# Patient Record
Sex: FEMALE | Race: WHITE | Employment: FULL TIME | URBAN - METROPOLITAN AREA
[De-identification: names, ages, dates, MRNs, and addresses within clinical notes are randomized per-mention and may not be internally consistent; named-entity substitution may affect disease eponyms.]

---

## 2024-05-06 ENCOUNTER — APPOINTMENT (EMERGENCY)
Dept: RADIOLOGY | Facility: HOSPITAL | Age: 24
End: 2024-05-06
Payer: OTHER MISCELLANEOUS

## 2024-05-06 ENCOUNTER — HOSPITAL ENCOUNTER (EMERGENCY)
Facility: HOSPITAL | Age: 24
Discharge: HOME | End: 2024-05-06
Attending: EMERGENCY MEDICINE | Admitting: EMERGENCY MEDICINE
Payer: OTHER MISCELLANEOUS

## 2024-05-06 VITALS
HEART RATE: 72 BPM | SYSTOLIC BLOOD PRESSURE: 133 MMHG | OXYGEN SATURATION: 100 % | TEMPERATURE: 97.8 F | RESPIRATION RATE: 20 BRPM | DIASTOLIC BLOOD PRESSURE: 75 MMHG

## 2024-05-06 DIAGNOSIS — S66.911A SPRAIN AND STRAIN OF RIGHT HAND: ICD-10-CM

## 2024-05-06 DIAGNOSIS — S63.91XA SPRAIN AND STRAIN OF RIGHT HAND: ICD-10-CM

## 2024-05-06 DIAGNOSIS — S60.221A CONTUSION OF DORSUM OF RIGHT HAND: Primary | ICD-10-CM

## 2024-05-06 PROCEDURE — 99283 EMERGENCY DEPT VISIT LOW MDM: CPT

## 2024-05-06 PROCEDURE — 73110 X-RAY EXAM OF WRIST: CPT

## 2024-05-06 PROCEDURE — 73130 X-RAY EXAM OF HAND: CPT

## 2024-05-06 PROCEDURE — 96372 THER/PROPH/DIAG INJ SC/IM: CPT

## 2024-05-06 PROCEDURE — 99284 EMERGENCY DEPT VISIT MOD MDM: CPT | Performed by: PHYSICIAN ASSISTANT

## 2024-05-06 RX ORDER — KETOROLAC TROMETHAMINE 30 MG/ML
15 INJECTION, SOLUTION INTRAMUSCULAR; INTRAVENOUS ONCE
Status: COMPLETED | OUTPATIENT
Start: 2024-05-06 | End: 2024-05-06

## 2024-05-06 RX ORDER — ACETAMINOPHEN 325 MG/1
975 TABLET ORAL ONCE
Status: COMPLETED | OUTPATIENT
Start: 2024-05-06 | End: 2024-05-06

## 2024-05-06 RX ADMIN — ACETAMINOPHEN 975 MG: 325 TABLET, FILM COATED ORAL at 22:27

## 2024-05-06 RX ADMIN — KETOROLAC TROMETHAMINE 15 MG: 30 INJECTION, SOLUTION INTRAMUSCULAR; INTRAVENOUS at 22:27

## 2024-05-06 NOTE — Clinical Note
Kori Davis was seen and treated in our emergency department on 5/6/2024.                Diagnosis:     Kori  .    She may return on this date: 05/08/2024         If you have any questions or concerns, please don't hesitate to call.      Sukhwinder Deluca PA-C    ______________________________           _______________          _______________  Hospital Representative                              Date                                Time

## 2024-05-07 ENCOUNTER — OCCMED (OUTPATIENT)
Dept: URGENT CARE | Facility: CLINIC | Age: 24
End: 2024-05-07
Payer: OTHER MISCELLANEOUS

## 2024-05-07 ENCOUNTER — HOSPITAL ENCOUNTER (EMERGENCY)
Facility: HOSPITAL | Age: 24
Discharge: HOME | End: 2024-05-07
Attending: EMERGENCY MEDICINE
Payer: OTHER MISCELLANEOUS

## 2024-05-07 VITALS
RESPIRATION RATE: 16 BRPM | DIASTOLIC BLOOD PRESSURE: 80 MMHG | OXYGEN SATURATION: 99 % | WEIGHT: 136.91 LBS | TEMPERATURE: 98.4 F | SYSTOLIC BLOOD PRESSURE: 132 MMHG | HEART RATE: 90 BPM

## 2024-05-07 DIAGNOSIS — S60.221A CONTUSION OF RIGHT HAND, INITIAL ENCOUNTER: ICD-10-CM

## 2024-05-07 DIAGNOSIS — S60.221A CONTUSION OF RIGHT HAND, INITIAL ENCOUNTER: Primary | ICD-10-CM

## 2024-05-07 DIAGNOSIS — S69.91XA HAND INJURY, RIGHT, INITIAL ENCOUNTER: Primary | ICD-10-CM

## 2024-05-07 PROCEDURE — 29125 APPL SHORT ARM SPLINT STATIC: CPT | Performed by: PHYSICIAN ASSISTANT

## 2024-05-07 PROCEDURE — 99244 OFF/OP CNSLTJ NEW/EST MOD 40: CPT | Performed by: ORTHOPAEDIC SURGERY

## 2024-05-07 PROCEDURE — 99283 EMERGENCY DEPT VISIT LOW MDM: CPT

## 2024-05-07 PROCEDURE — 99214 OFFICE O/P EST MOD 30 MIN: CPT | Performed by: PHYSICIAN ASSISTANT

## 2024-05-07 PROCEDURE — 99284 EMERGENCY DEPT VISIT MOD MDM: CPT | Performed by: EMERGENCY MEDICINE

## 2024-05-07 PROCEDURE — 96374 THER/PROPH/DIAG INJ IV PUSH: CPT

## 2024-05-07 RX ORDER — KETOROLAC TROMETHAMINE 30 MG/ML
15 INJECTION, SOLUTION INTRAMUSCULAR; INTRAVENOUS ONCE
Status: COMPLETED | OUTPATIENT
Start: 2024-05-07 | End: 2024-05-07

## 2024-05-07 RX ORDER — IBUPROFEN 400 MG/1
400 TABLET ORAL EVERY 6 HOURS PRN
Qty: 12 TABLET | Refills: 0 | Status: SHIPPED | OUTPATIENT
Start: 2024-05-07 | End: 2024-05-17

## 2024-05-07 RX ADMIN — KETOROLAC TROMETHAMINE 15 MG: 30 INJECTION, SOLUTION INTRAMUSCULAR; INTRAVENOUS at 14:37

## 2024-05-07 NOTE — ED PROVIDER NOTES
"History  Chief Complaint   Patient presents with    Wrist Injury     Pt was at work and hit her right wrist/hand off of a machine. Did not take anything for pain. Pt report tingling in fingers.      Patient is a 23-year-old female with no significant past medical or surgical history that presents to the emergency department with aching persistent nonradiating right hand pain for approximately 3 hours.  Patient denies associated symptoms.  Patient states that she is currently right-handed.  Patient states that while she was at work, she was working on \"a bolt machine and the door fell on my hand.\"  Patient denies right hand injury.  Patient denies palliative factors or provocative factors of pressure to right hand.  Patient denies not effective treatment.  Patient denies fevers, chills, nausea, vomiting, diarrhea, constipation or urinary symptoms.  Patient denies numbness, tingling, loss of power of any other extremities.  Patient denies chest pain, shortness of breath, and abdominal pain.      History provided by:  Patient   used: No        None       No past medical history on file.    No past surgical history on file.    No family history on file.  I have reviewed and agree with the history as documented.    E-Cigarette/Vaping     E-Cigarette/Vaping Substances     Social History     Tobacco Use    Smoking status: Never    Smokeless tobacco: Never   Substance Use Topics    Alcohol use: Never    Drug use: Never       Review of Systems   Constitutional:  Negative for activity change, appetite change, chills and fever.   HENT:  Negative for congestion, ear pain, postnasal drip, rhinorrhea, sinus pressure, sinus pain, sore throat and tinnitus.    Eyes:  Negative for photophobia, pain and visual disturbance.   Respiratory:  Negative for cough, chest tightness and shortness of breath.    Cardiovascular:  Negative for chest pain and palpitations.   Gastrointestinal:  Negative for abdominal pain, " constipation, diarrhea, nausea and vomiting.   Genitourinary:  Negative for difficulty urinating, dysuria, flank pain, frequency, hematuria and urgency.   Musculoskeletal:  Positive for arthralgias. Negative for back pain, gait problem, neck pain and neck stiffness.   Skin:  Negative for color change, pallor and rash.   Allergic/Immunologic: Negative for environmental allergies and food allergies.   Neurological:  Negative for dizziness, seizures, syncope, weakness, numbness and headaches.   Psychiatric/Behavioral:  Negative for confusion.    All other systems reviewed and are negative.      Physical Exam  Physical Exam  Vitals and nursing note reviewed.   Constitutional:       General: She is awake.      Appearance: Normal appearance. She is well-developed and normal weight. She is not ill-appearing, toxic-appearing or diaphoretic.      Comments: /75 (BP Location: Left arm)   Pulse 72   Temp 97.8 °F (36.6 °C) (Oral)   Resp 20   SpO2 100%      HENT:      Head: Normocephalic and atraumatic.      Jaw: There is normal jaw occlusion.      Right Ear: Hearing, tympanic membrane and external ear normal. No decreased hearing noted. No drainage, swelling or tenderness. No mastoid tenderness.      Left Ear: Hearing, tympanic membrane and external ear normal. No decreased hearing noted. No drainage, swelling or tenderness. No mastoid tenderness.      Nose: Nose normal.      Mouth/Throat:      Lips: Pink.      Mouth: Mucous membranes are moist.      Pharynx: Oropharynx is clear. Uvula midline.   Eyes:      General: Lids are normal. Vision grossly intact. Gaze aligned appropriately.         Right eye: No discharge.         Left eye: No discharge.      Extraocular Movements: Extraocular movements intact.      Conjunctiva/sclera: Conjunctivae normal.      Pupils: Pupils are equal, round, and reactive to light.   Neck:      Vascular: No JVD.      Trachea: Trachea and phonation normal. No tracheal tenderness or tracheal  deviation.   Cardiovascular:      Rate and Rhythm: Normal rate and regular rhythm.      Pulses: Normal pulses.           Radial pulses are 2+ on the right side and 2+ on the left side.      Heart sounds: Normal heart sounds. No murmur heard.  Pulmonary:      Effort: Pulmonary effort is normal. No respiratory distress.      Breath sounds: Normal breath sounds and air entry. No stridor. No decreased breath sounds, wheezing, rhonchi or rales.   Abdominal:      Palpations: Abdomen is not rigid.   Musculoskeletal:         General: No swelling. Normal range of motion.      Right hand: Tenderness and bony tenderness present.        Arms:       Cervical back: Full passive range of motion without pain, normal range of motion and neck supple. No rigidity. No spinous process tenderness or muscular tenderness. Normal range of motion.      Comments: Passive ROM intact  lower extremity 5/5 bilaterally  Left upper extremity 5/5, right upper extremity, shoulder 5/5, elbow 5/5, wrist and hand 4/5  Neurovascularly intact  No grinding or clicking of joints           Feet:      Right foot:      Toenail Condition: Right toenails are normal.      Left foot:      Toenail Condition: Left toenails are normal.   Lymphadenopathy:      Head:      Right side of head: No submental, submandibular, tonsillar, preauricular, posterior auricular or occipital adenopathy.      Left side of head: No submental, submandibular, tonsillar, preauricular, posterior auricular or occipital adenopathy.      Cervical: No cervical adenopathy.      Right cervical: No superficial, deep or posterior cervical adenopathy.     Left cervical: No superficial, deep or posterior cervical adenopathy.   Skin:     General: Skin is warm and dry.      Capillary Refill: Capillary refill takes less than 2 seconds.      Findings: No rash.   Neurological:      General: No focal deficit present.      Mental Status: She is alert and oriented to person, place, and time. Mental status  is at baseline.      GCS: GCS eye subscore is 4. GCS verbal subscore is 5. GCS motor subscore is 6.      Sensory: No sensory deficit.      Deep Tendon Reflexes: Reflexes are normal and symmetric.      Reflex Scores:       Patellar reflexes are 2+ on the right side and 2+ on the left side.  Psychiatric:         Attention and Perception: Attention normal.         Mood and Affect: Mood normal.         Speech: Speech normal.         Behavior: Behavior normal. Behavior is cooperative.         Thought Content: Thought content normal.         Judgment: Judgment normal.         Vital Signs  ED Triage Vitals [05/06/24 2050]   Temperature Pulse Respirations Blood Pressure SpO2   97.8 °F (36.6 °C) 72 20 133/75 100 %      Temp Source Heart Rate Source Patient Position - Orthostatic VS BP Location FiO2 (%)   Oral Monitor -- Left arm --      Pain Score       --           Vitals:    05/06/24 2050   BP: 133/75   Pulse: 72         Visual Acuity      ED Medications  Medications   ketorolac (TORADOL) injection 15 mg (15 mg Intramuscular Given 5/6/24 2227)   acetaminophen (TYLENOL) tablet 975 mg (975 mg Oral Given 5/6/24 2227)       Diagnostic Studies  Results Reviewed       None                   XR wrist 3+ views RIGHT   ED Interpretation by Sukhwinder Deluca PA-C (05/06 2305)   No acute osseous normality on initial read by me.      XR hand 3+ views RIGHT   ED Interpretation by Sukhwinder Deluca PA-C (05/06 230)   No acute osseous abnormality on initial read by me.                 Procedures  Procedures         ED Course                                             Medical Decision Making  Patient is a 23-year-old female with no significant past medical or surgical history that presents to the emergency department with aching persistent nonradiating right hand pain for approximately 3 hours.  Hemodynamically stable and afebrile  Right hand x-ray and right wrist x-ray with no acute osseous abnormality on initial read  Patient denied offered  pregnancy test  Toradol and acetaminophen delivered with patient verbalized decrease in right hand symptoms status post medication delivery  Traumatic dorsal right hand ecchymosis, right hand strain versus sprain  Ace wrap  Counseled patient on taking over-the-counter Motrin 400 mg as needed every 6 hours as needed for right hand pain not to exceed 3600 mg daily as needed.  Follow-up with physical therapy  Follow-up with occupational medicine   Follow-up with PCP  Follow-up with emergency department should symptoms persist or exacerbate  Patient demonstrates verbal understandable clinical imaging findings, discharge instructions follow-up and verbalized agreement with patient current treatment plan.    Amount and/or Complexity of Data Reviewed  Radiology: ordered and independent interpretation performed. Decision-making details documented in ED Course.    Risk  OTC drugs.  Prescription drug management.             Disposition  Final diagnoses:   Contusion of dorsum of right hand   Sprain and strain of right hand     Time reflects when diagnosis was documented in both MDM as applicable and the Disposition within this note       Time User Action Codes Description Comment    5/6/2024 11:10 PM Sukhwinder Deluca [S60.221A] Contusion of dorsum of right hand     5/6/2024 11:10 PM Sukhwinder Deluca Add [S63.91XS] Hand sprain, right, sequela     5/6/2024 11:10 PM Sukhwinder Deluca Remove [S63.91XS] Hand sprain, right, sequela     5/6/2024 11:10 PM Sukhwinder Deluca Add [S63.91XA,  S66.911A] Sprain and strain of right hand           ED Disposition       ED Disposition   Discharge    Condition   Stable    Date/Time   Mon May 6, 2024 11:10 PM    Comment   Kori Dvais discharge to home/self care.                   Follow-up Information       Follow up With Specialties Details Why Contact Info Additional Information    [address] [address] [phone]   [facility], [address] [place] [address], [id number] [phone]    [facility] Emergency Medicine   [address] [facility] [address]  [phone] [facility], [address] [place], [place], [address]    Physical Therapy at [facility] Physical Therapy   [address] [facility] [facility] [address]  [facility] [address]  [phone] Physical Therapy at [facility], [address] [facility] [facility] [address], [place], [place], [address]   [phone]    [facility] Occupational Medicine [address]  [facility] [address]  [phone]             There are no discharge medications for this patient.      No discharge procedures on file.    PDMP Review         Value Time User    PDMP Reviewed  Yes 5/6/2024 11:11 PM Sukhwinder Deluca PA-C            ED Provider  Electronically Signed by             Sukhwinder Deluca PA-C  05/07/24 0642

## 2024-05-07 NOTE — Clinical Note
Kori Davis was seen and treated in our emergency department on 5/7/2024.                Diagnosis:     Kori  may return to work on return date.    She may return on this date: 05/10/2024         If you have any questions or concerns, please don't hesitate to call.      Antonio Bennett, DO    ______________________________           _______________          _______________  Hospital Representative                              Date                                Time

## 2024-05-07 NOTE — CONSULTS
Orthopedics   Kori Ryan 23 y.o. female MRN: [id number]  Unit/Bed#: ED-31      Chief Complaint:   Right hand pain, s/p crush injury    HPI:  23 y.o. female who presents for evaluation of worsening right hand pain with associated bruising/ecchymosis. At time of consultation patient reports sustaining a crush injury at work last night were she got her hand stuck in a metal press. She was initially seen in the ER overnight, and fractures were ruled out. Orthopedics engaged to rule out compartment syndrome of the hand given worsening pain and edema.   At time of consult patient denies numbness/tingling. She complains of pain in the dorsum of the hand and over the palm. No pain through the fingers. The hand does not feel cold. The digits are not pale.   She has no wrist pain.     Review Of Systems:   Skin: Normal  Neuro: See HPI  Musculoskeletal: See HPI  14 point review of systems negative except as stated above     Past Medical History:   Past Medical History:   Diagnosis Date    Disease of thyroid gland        Past Surgical History:   History reviewed. No pertinent surgical history.    Family History:  Family history reviewed and non-contributory  History reviewed. No pertinent family history.    Social History:  Social History     Socioeconomic History    Marital status: Single     Spouse name: None    Number of children: None    Years of education: None    Highest education level: None   Occupational History    None   Tobacco Use    Smoking status: Never    Smokeless tobacco: Never   Substance and Sexual Activity    Alcohol use: Never    Drug use: Never    Sexual activity: None   Other Topics Concern    None   Social History Narrative    None     Social Determinants of Health     Financial Resource Strain: Not on file   Food Insecurity: Not on file   Transportation Needs: Not on file   Physical Activity: Not on file   Stress: Not on file   Social Connections: Not on file   Intimate Partner Violence: Not on file  "  Housing Stability: Not on file       Allergies:   No Known Allergies        Labs:  No results found for: \"HCT\", \"HGB\", \"PT\", \"INR\", \"WBC\", \"ESR\", \"CRP\"    Meds:  No current facility-administered medications for this encounter.  No current outpatient medications on file.    Blood Culture:   No results found for: \"BLOODCX\"    Wound Culture:   No results found for: \"WOUNDCULT\"    Ins and Outs:  No intake/output data recorded.          Physical Exam:   /80 (BP Location: Left arm)   Pulse 90   Temp 98.4 °F (36.9 °C) (Oral)   Resp 16   Wt 62.1 kg (136 lb 14.5 oz)   SpO2 99%   Gen: No acute distress, resting comfortably in bed  HEENT: Eyes clear, moist mucus membranes, hearing intact  Respiratory: No audible wheezing or stridor  Cardiovascular: Well Perfused peripherally, 2+ distal pulse  Abdomen: nondistended, no peritoneal signs  Musculoskeletal: right upper extremity  Skin: please see wound images below for full characterization of hand swelling.   She has ttp over the dorsum of the hand and over the palm.   No pain in the thenar eminence or in the thumb.   No pain in the digits or along any of the PIP or DIP joints.   Her range of motion is limited secondary to pain.   Musculature in the palm and dorsum of the hand is soft and compressible.   She is able to move all digits. No pain with any movement of the thumb. She does have increased pain with passive stretch of the digits, but distractible.   SILT m/r/u.   Motor intact ain/pin/m/r/u  2+ radial and ulnar pulse                      Tertiary: no tenderness over all other joints/long bones as except already stated.    Radiology:   I personally reviewed the films.  XRAY right hand/wrist: no acute osseous abnormality.     Procedure:   Patient was placed into a well padded volar resting splint. Patient tolerated procedure well and was neurovascularly intact both pre and post procedure. "     _*_*_*_*_*_*_*_*_*_*_*_*_*_*_*_*_*_*_*_*_*_*_*_*_*_*_*_*_*_*_*_*_*_*_*_*_*_*_*_*_*    Assessment:  23 y.o.female with right hand crush injury.  Based on clinical exam and discussion with ortho hand attg, do not suspect compartment syndrome at this time.      Plan:   NWB right hand in volar resting splint.   Strict ice/elevation.   Digital ROM.   No immediate orthopedic intervention indicated at this time.   Will place patient into volar resting splint today.   Pain control per primary team. .  Dispo: Ortho signing off  Patient to follow up with Dr. Mantilla as outpatient in 3-5 days for re-evaluation.     Kimberly Schwartz PA-C

## 2024-05-07 NOTE — DISCHARGE INSTR - AVS FIRST PAGE
Discharge Instructions - Orthopedics  Kori Davis 23 y.o. female MRN: [id number]  Unit/Bed#: ED-31    Weight Bearing Status:                                           Non weight bearing to the right upper extremity in splint.   Keep your hand elevated at all times.   Use ice.     Pain:  Continue analgesics as directed    Dressing Instructions:   Please keep clean, dry and intact until follow up     Appt Instructions:   If you do not have your appointment, please call the clinic at [phone] to schedule with Dr. Mantilla  Otherwise follow up as scheduled.    Contact the office sooner if you experience any increased numbness/tingling in the extremities.

## 2024-05-07 NOTE — ED ATTENDING ATTESTATION
5/7/2024  IRichmond DO, saw and evaluated the patient. I have discussed the patient with the resident/non-physician practitioner and agree with the resident's/non-physician practitioner's findings, Plan of Care, and MDM as documented in the resident's/non-physician practitioner's note, except where noted. All available labs and Radiology studies were reviewed.  I was present for key portions of any procedure(s) performed by the resident/non-physician practitioner and I was immediately available to provide assistance.       At this point I agree with the current assessment done in the Emergency Department.  I have conducted an independent evaluation of this patient a history and physical is as follows: 23yoF, no pertinent pmhx, presenting with continued rt hand pain s/p industrial accident where it was struck by moving object. XR without acute patho. Pt instructed to start NSAID. Has not. Redirected to ED via urgent care for compartment syndrome r/o.     VSS. Pt tearful. RUE: elbow full ROM. Radial pulse 2+. Digits rt and lt hand equal in warmth. Pulse ox placed on all digits affected hand and 100% on all digits. Cap refill same, approx 3s b/l hands. No skin breaks. Ecchymosis from mid hand down proximal digits. Unable to assess ROM 2/2 pt reluctance due to pain. Rt wrist full ROM.     28-year-old female with right hand bruising.  Other than limitation secondary to bruising, no signs of distal neurovascular limitation.  Seen by Ortho PA and reviewed with hand surgeon and ultimately decision was to splint and follow-up as outpatient.  Instructions provided in regards to the same.  Plan for analgesia discussed. Reviewed all findings both relevant and incidental with the patient at bedside. Pt verbalized understanding of findings, neccesary follow up, return to ED precautions. Pt agreed to review today's findings with their primary care provider. Pt non-toxic appearing upon discharge.           ED Course          Critical Care Time  Procedures

## 2024-05-07 NOTE — ED PROVIDER NOTES
History  Chief Complaint   Patient presents with    Hand Injury     Pt injured right hand with machine yesterday. Pt seen here for the same last night. [facility] in [facility] now with concern for comparments syndrome. +pain and bruising. Pulses and sensation intact     Patient is a 23-year-old female with no significant PMH that presents to the emergency department with pain and swelling to the right hand.  Patient was seen and evaluated in the emergency department yesterday where x-rays were performed and she was found to have no fracture.  She reports she was working on a bolt machine when a door came down and struck her hand.  After getting pain medication and cleared by x-ray imaging last night, she was discharged home and was able to sleep through the night.  She reports that she slept with her hand resting on her chest.  She reports that when she woke she felt well but send she moves the fingers on her hand she experience significant pain.  It was recommended that she take Tylenol and Motrin but took no pain medications prior to arrival today.  She was seen and evaluated at urgent care today who sent her to the ED for further evaluation.  She reports sensation is intact in that hand but that anytime she moves the hand she experiences significant pain.  The patient is right-handed.  She denies any injury elsewhere in her body.        None       Past Medical History:   Diagnosis Date    Disease of thyroid gland        History reviewed. No pertinent surgical history.    History reviewed. No pertinent family history.  I have reviewed and agree with the history as documented.    E-Cigarette/Vaping     E-Cigarette/Vaping Substances     Social History     Tobacco Use    Smoking status: Never    Smokeless tobacco: Never   Substance Use Topics    Alcohol use: Never    Drug use: Never        Review of Systems   Constitutional:  Negative for chills and fever.   Eyes:  Negative for pain and visual disturbance.    Respiratory:  Negative for cough and shortness of breath.    Cardiovascular:  Negative for chest pain and palpitations.   Gastrointestinal:  Negative for abdominal pain, constipation, diarrhea, nausea and vomiting.   Musculoskeletal:  Negative for arthralgias and back pain.        Right hand pain and swelling   Skin:  Positive for color change (bruising, R hand). Negative for rash.   Neurological:  Negative for weakness and numbness.   All other systems reviewed and are negative.      Physical Exam  ED Triage Vitals [05/07/24 1405]   Temperature Pulse Respirations Blood Pressure SpO2   98.4 °F (36.9 °C) 90 16 132/80 99 %      Temp Source Heart Rate Source Patient Position - Orthostatic VS BP Location FiO2 (%)   Oral Monitor Lying Left arm --      Pain Score       10 - Worst Possible Pain             Orthostatic Vital Signs  Vitals:    05/07/24 1405   BP: 132/80   Pulse: 90   Patient Position - Orthostatic VS: Lying       Physical Exam  Vitals and nursing note reviewed.   Constitutional:       General: She is not in acute distress.     Appearance: Normal appearance. She is well-developed. She is not ill-appearing.   HENT:      Head: Normocephalic and atraumatic.      Right Ear: External ear normal.      Left Ear: External ear normal.      Mouth/Throat:      Pharynx: Oropharynx is clear. No oropharyngeal exudate or posterior oropharyngeal erythema.   Eyes:      General:         Right eye: No discharge.         Left eye: No discharge.      Extraocular Movements: Extraocular movements intact.      Conjunctiva/sclera: Conjunctivae normal.   Cardiovascular:      Rate and Rhythm: Normal rate and regular rhythm.      Pulses: Normal pulses.      Heart sounds: Normal heart sounds. No murmur heard.  Pulmonary:      Effort: Pulmonary effort is normal. No respiratory distress.      Breath sounds: Normal breath sounds. No wheezing, rhonchi or rales.   Musculoskeletal:         General: Swelling (R hand) and tenderness (R hand)  present. No deformity.      Cervical back: Neck supple. No tenderness.      Comments: Pain with extension of fingers of right hand   Skin:     General: Skin is warm and dry.      Capillary Refill: Capillary refill takes 2 to 3 seconds. Slightly delayed in fingertips.  Pulse ox normal on each finger tip     Findings: Bruising (R hand) present.   Neurological:      Mental Status: She is alert.         ED Medications  Medications   ketorolac (TORADOL) injection 15 mg (15 mg Intravenous Given 5/7/24 1437)       Diagnostic Studies  Results Reviewed       Procedure Component Value Units Date/Time    POCT pregnancy, urine [[id number]]     Lab Status: No result                    No orders to display         Procedures  Procedures      ED Course  ED Course as of 05/07/24 1538   Tue May 07, 2024   1431 Reached out to hand orthopedic specialist on-call, Dr. Mantilla for recommendations   1456 Dr. Mantilla recommends reaching out to orthopedic PA on-call to come and evaluate    1518 Ortho specialist Kimberly Schwartz, evaluated the patient and recommend Motrin, rest, ice, volar splint for comfort, and follow-up in the office                                       Medical Decision Making  23F here with right hand pain after injury yesterday.    DDx including but not limited to: contusion, fracture, sprain, strain, tendinitis, tenosynovitis, arthritis, carpal tunnel syndrome, cellulitis, lymphangitis, osteomyelitis, ischemic limb, ganglion cyst, radiculopathy, gout, diabetic neuropathy, lyme disease; doubt septic arthritis.      X-rays from yesterday reviewed-no fracture identified    Given patient's pain with passive extension, patient's case discussed with hand surgeon on-call, Dr. Mantilla, who recommends evaluation in the ED by orthopedic PA.  Hand consult placed and orthopedic PA, Kimberly Schwratz, who came and evaluated the patient and has low suspicion for compartment syndrome but recommends Motrin, rest, ice, elevation, and  splint placement.  Orthopedic PA placed splint while in the emergency department.    Hand team will arrange for prompt follow-up in the office within the next 2 days.    In the absence of additional concerning findings on physical exam patient is appropriate for discharge at this time.  Patient provided with informational handout that discusses symptom management and reasons to return to the emergency department.  Return precautions are discussed and the patient and/or family demonstrate understanding of the plan.  Their questions are all answered to their satisfaction and the patient is discharged.        Amount and/or Complexity of Data Reviewed  Labs: ordered.    Risk  Prescription drug management.          Disposition  Final diagnoses:   Hand injury, right, initial encounter   Contusion of right hand, initial encounter     Time reflects when diagnosis was documented in both MDM as applicable and the Disposition within this note       Time User Action Codes Description Comment    5/7/2024  3:06 PM Antonio Bennett [S69.91XA] Hand injury, right, initial encounter     5/7/2024  3:20 PM Antonio Bennett [S60.221A] Contusion of right hand, initial encounter           ED Disposition       ED Disposition   Discharge    Condition   Stable    Date/Time   Tue May 7, 2024  3:20 PM    Comment   Kori Davis discharge to home/self care.                   Follow-up Information       Follow up With Specialties Details Why Contact Info    Hudson Mantilla MD Orthopedic Surgery, Hand Surgery Schedule an appointment as soon as possible for a visit   [address] [place] [id number] [phone]              Patient's Medications   Discharge Prescriptions    IBUPROFEN (MOTRIN) 400 MG TABLET    Take 1 tablet (400 mg total) by mouth every 6 (six) hours as needed for mild pain for up to 3 days       Start Date: 5/7/2024  End Date: 5/10/2024       Order Dose: 400 mg       Quantity: 12 tablet    Refills: 0      No discharge procedures on file.    PDMP Review         Value Time User    PDMP Reviewed  Yes 5/6/2024 11:11 PM Sukhwinder Deluca PA-C             ED Provider  Attending physically available and evaluated Kori Davis. I managed the patient along with the ED Attending.    Electronically Signed by           Antonio Bennett DO  05/07/24 153

## 2024-05-08 ENCOUNTER — APPOINTMENT (OUTPATIENT)
Dept: URGENT CARE | Facility: CLINIC | Age: 24
End: 2024-05-08
Payer: OTHER MISCELLANEOUS

## 2024-05-08 PROCEDURE — 99214 OFFICE O/P EST MOD 30 MIN: CPT | Performed by: FAMILY MEDICINE

## 2024-05-17 ENCOUNTER — OFFICE VISIT (OUTPATIENT)
Dept: OBGYN CLINIC | Facility: CLINIC | Age: 24
End: 2024-05-17

## 2024-05-17 VITALS — WEIGHT: 136 LBS

## 2024-05-17 DIAGNOSIS — S60.221A CONTUSION OF RIGHT HAND, INITIAL ENCOUNTER: Primary | ICD-10-CM

## 2024-05-17 PROCEDURE — 99214 OFFICE O/P EST MOD 30 MIN: CPT | Performed by: STUDENT IN AN ORGANIZED HEALTH CARE EDUCATION/TRAINING PROGRAM

## 2024-05-17 RX ORDER — SENNOSIDES 8.6 MG
TABLET ORAL
COMMUNITY

## 2024-05-17 RX ORDER — NORETHINDRONE ACETATE AND ETHINYL ESTRADIOL AND FERROUS FUMARATE 1.5-30(21)
KIT ORAL
COMMUNITY

## 2024-05-17 RX ORDER — POLYETHYLENE GLYCOL 3350 17 G/DOSE
POWDER (GRAM) ORAL
COMMUNITY
Start: 2024-05-09

## 2024-05-17 RX ORDER — DICLOFENAC SODIUM 75 MG/1
75 TABLET, DELAYED RELEASE ORAL 2 TIMES DAILY WITH MEALS
COMMUNITY
Start: 2024-05-08

## 2024-05-17 RX ORDER — PHENTERMINE HYDROCHLORIDE 37.5 MG/1
37.5 CAPSULE ORAL
COMMUNITY

## 2024-05-17 RX ORDER — LEVOTHYROXINE SODIUM 0.05 MG/1
50 TABLET ORAL DAILY
COMMUNITY

## 2024-05-17 RX ORDER — TOPIRAMATE 25 MG/1
25 TABLET ORAL 2 TIMES DAILY
COMMUNITY
Start: 2024-03-01

## 2024-05-17 RX ORDER — FLUTICASONE PROPIONATE 50 MCG
SPRAY, SUSPENSION (ML) NASAL
COMMUNITY

## 2024-05-17 RX ORDER — METHOCARBAMOL 500 MG/1
500 TABLET, FILM COATED ORAL 2 TIMES DAILY
Qty: 30 TABLET | Refills: 0 | Status: SHIPPED | OUTPATIENT
Start: 2024-05-17

## 2024-05-17 NOTE — PROGRESS NOTES
ORTHOPAEDIC HAND, WRIST, AND ELBOW OFFICE  VISIT      ASSESSMENT/PLAN:      Diagnoses and all orders for this visit:    Contusion of right hand, initial encounter  -     methocarbamol (ROBAXIN) 500 mg tablet; Take 1 tablet (500 mg total) by mouth 2 (two) times a day  -     Ambulatory Referral to PT/OT Hand Therapy; Future    Other orders  -     diclofenac (VOLTAREN) 75 mg EC tablet; Take 75 mg by mouth 2 (two) times a day with meals  -     Dextromethorphan-guaiFENesin  MG/5ML LIQD; Take by mouth every 6 (six) hours  -     fluticasone (FLONASE) 50 mcg/act nasal spray; spray 2 sprays into each nostril every day  -     levothyroxine 50 mcg tablet; Take 50 mcg by mouth daily  -     Lety FE 1.5/30 1.5-30 MG-MCG tablet; TAKE 1 TABLET BY MOUTH EVERY DAY FOR BIRTH CONTROL.  -     phentermine 37.5 MG capsule; Take 37.5 mg by mouth  -     [employer] Purelax 17 GM/SCOOP powder; TAKE 17 G BY MOUTH 2 (TWO) TIMES A DAY. MIX 1 HEAPING TABLESPOON (17 G) WITH 8 OUNCES OF WATER. PREPARE AND DRINK SOLUTION ONCE DAILY.  -     [employer] Senna 8.6 MG tablet; TAKE 1 TABLET (8.6 MG TOTAL) BY MOUTH DAILY AS NEEDED FOR CONSTIPATION (IF NO BM FOR 1 DAY).  -     topiramate (TOPAMAX) 25 mg tablet; Take 25 mg by mouth 2 (two) times a day          23 y.o. female with right hand contusion sustained at work 5/6/2024  Treatment options and expected outcomes were discussed.  The patient verbalized understanding of exam findings and treatment plan.   The patient was given the opportunity to ask questions.  Questions were answered to the patient's satisfaction.  The patient decided to move forward with occupational therapy. Recommend starting with physical therapy to address range of motion.   Patient was provided work note to continue restrictions at this time  She may use the wrist brace as needed  Continue with Diclofenac prescription as prescribed by ED and tylenol as needed for pain  Robaxin sent to patients pharmacy to help with muscle spasm  "      Follow Up:  6 weeks       To Do Next Visit:   Repeat x-ray or right hand if patient is still having significant pain            Oneal Sanders MD  Attending, Orthopaedic Surgery  Hand, Wrist, and Elbow Surgery  [facility]    ______________________________________________________________________________________________    CHIEF COMPLAINT:  Chief Complaint   Patient presents with   • Right Wrist - Pain       SUBJECTIVE:  Patient is a 23 y.o. RHD female who presents today for evaluation and treatment of right hand injury. Patients hand was injured while at work 5/6/2024, when a \"bolt machine door fell on her hand\". She was seen in ED, where x-rays were obtained demonstrating no acute osseous abnormalities.  Patient had follow up at [facility] 5/7/2024 and was transferred back to ED. She presents today in a cock up wrist brace. Patient notes significant pain to the volar hand index and long fingers. She states she is unable to extend or flex her fingers.      Occupation: Makes housing/bolts      I have personally reviewed all the relevant PMH, PSH, SH, FH, Medications and allergies      PAST MEDICAL HISTORY:  Past Medical History:   Diagnosis Date   • Disease of thyroid gland        PAST SURGICAL HISTORY:  History reviewed. No pertinent surgical history.    FAMILY HISTORY:  History reviewed. No pertinent family history.    SOCIAL HISTORY:  Social History     Tobacco Use   • Smoking status: Never   • Smokeless tobacco: Never   Substance Use Topics   • Alcohol use: Never   • Drug use: Never       MEDICATIONS:    Current Outpatient Medications:   •  [employer] Purelax 17 GM/SCOOP powder, TAKE 17 G BY MOUTH 2 (TWO) TIMES A DAY. MIX 1 HEAPING TABLESPOON (17 G) WITH 8 OUNCES OF WATER. PREPARE AND DRINK SOLUTION ONCE DAILY., Disp: , Rfl:   •  [employer] Senna 8.6 MG tablet, TAKE 1 TABLET (8.6 MG TOTAL) BY MOUTH DAILY AS NEEDED FOR CONSTIPATION (IF NO BM FOR 1 DAY)., Disp: , Rfl:   •  " "Dextromethorphan-guaiFENesin  MG/5ML LIQD, Take by mouth every 6 (six) hours, Disp: , Rfl:   •  diclofenac (VOLTAREN) 75 mg EC tablet, Take 75 mg by mouth 2 (two) times a day with meals, Disp: , Rfl:   •  fluticasone (FLONASE) 50 mcg/act nasal spray, spray 2 sprays into each nostril every day, Disp: , Rfl:   •  Lety FE 1.5/30 1.5-30 MG-MCG tablet, TAKE 1 TABLET BY MOUTH EVERY DAY FOR BIRTH CONTROL., Disp: , Rfl:   •  ibuprofen (MOTRIN) 400 mg tablet, Take 1 tablet (400 mg total) by mouth every 6 (six) hours as needed for mild pain for up to 3 days, Disp: 12 tablet, Rfl: 0  •  levothyroxine 50 mcg tablet, Take 50 mcg by mouth daily, Disp: , Rfl:   •  methocarbamol (ROBAXIN) 500 mg tablet, Take 1 tablet (500 mg total) by mouth 2 (two) times a day, Disp: 30 tablet, Rfl: 0  •  phentermine 37.5 MG capsule, Take 37.5 mg by mouth, Disp: , Rfl:   •  topiramate (TOPAMAX) 25 mg tablet, Take 25 mg by mouth 2 (two) times a day, Disp: , Rfl:     ALLERGIES:  No Known Allergies        REVIEW OF SYSTEMS:  Musculoskeletal:        As noted in HPI.   All other systems reviewed and are negative.    VITALS:  There were no vitals filed for this visit.    LABS:  HgA1c: No results found for: \"HGBA1C\"  BMP:   Lab Results   Component Value Date    CALCIUM 9.1 12/02/2022    K 3.8 12/02/2022    CO2 22 (L) 12/02/2022     12/02/2022    BUN 9 12/02/2022    CREATININE 0.81 12/02/2022       _____________________________________________________  PHYSICAL EXAMINATION:  General: Well developed and well nourished, alert & oriented x 3, appears comfortable  Psychiatric: Normal  HEENT: Normocephalic, Atraumatic Trachea Midline, No torticollis  Pulmonary: No audible wheezing or respiratory distress   Abdomen/GI: Non tender, non distended   Cardiovascular: No pitting edema, 2+ radial pulse   Skin: No masses, erythema, lacerations, fluctation, ulcerations  Neurovascular: Sensation Intact to the Median, Ulnar, Radial Nerve, Motor Intact to the " Median, Ulnar, Radial Nerve, and Pulses Intact  Musculoskeletal: Normal, except as noted in detailed exam and in HPI.      MUSCULOSKELETAL EXAMINATION:    Right Hand/Wrist    MCP of index and middle finger 15 degrees shy of full extension   PIP of index and middle finger  30 degrees shy of full extension   Pulp to palm distance 4 cm   Diffusely TTP   Sensation intact  Brisk capillary refill   ___________________________________________________  STUDIES REVIEWED:  Xrays of the right hand and wrist were reviewed and independently interpreted in PACS by Dr. Sanders and demonstrate no acute osseous abnormalities          PROCEDURES PERFORMED:  Procedures  No Procedures performed today    _____________________________________________________      Scribe Attestation    I,:  Trinidad Ceron am acting as a scribe while in the presence of the attending physician.:       I,:  Oneal Sanders MD personally performed the services described in this documentation    as scribed in my presence.:

## 2024-05-17 NOTE — LETTER
May 17, 2024     Patient: Kori Davis  YOB: 2000  Date of Visit: 5/17/2024      To Whom it May Concern:    Kori Davis is under my professional care. Kori was seen in my office on 5/17/2024. Kori will be restricted at work with no use of her right hand.     If you have any questions or concerns, please don't hesitate to call.         Sincerely,          Oneal Sanders MD        CC: No Recipients

## 2024-06-05 ENCOUNTER — EVALUATION (OUTPATIENT)
Dept: PHYSICAL THERAPY | Facility: MEDICAL CENTER | Age: 24
End: 2024-06-05
Payer: OTHER MISCELLANEOUS

## 2024-06-05 DIAGNOSIS — S60.221A CONTUSION OF RIGHT HAND, INITIAL ENCOUNTER: Primary | ICD-10-CM

## 2024-06-05 PROCEDURE — 97140 MANUAL THERAPY 1/> REGIONS: CPT | Performed by: PHYSICAL THERAPIST

## 2024-06-05 NOTE — PROGRESS NOTES
PT Evaluation     Today's date: 2024  Patient name: Kori Davis  : 2000  MRN: [id number]  Referring provider: Oneal Sanders MD  Dx:   Encounter Diagnosis     ICD-10-CM    1. Contusion of right hand, initial encounter  S60.221A Ambulatory Referral to PT/OT Hand Therapy                     Assessment  Impairments: abnormal or restricted ROM, activity intolerance, impaired physical strength, lacks appropriate home exercise program, pain with function, unable to perform ADL and activity limitations  Symptom irritability: high    Assessment details: Pt is a 23 year old RHD female who presents to PT following R hand contusion from a work injury on 24. Pt reports she is unable to move her digits or use her R hand for any ADL's. Wears brace majority of the day. She presents with her digits in protective position, difficulty with active motion. Upon assessment, she had full range to her joints, unable to tolerate composite motion in both flexion and extension. She has diminished sensation to her digits and palm. We tried some MH and gentle massage to her palm, pt was able to tolerate and allowed more lengthening of her flexors. Pt should benefit from skilled PT to help reduce pain, reduce sensitivity and tenderness, increase ROM of her digits, increase strength once her ain and motion improve, and better her overall functioning. Thank you kindly for your referral.   Understanding of Dx/Px/POC: good     Prognosis: good    Goals  STG (2-3 weeks)  1:: pt independent in HEP  2: Pt able to form 75% comp fist  3: Pt able to extend 50% more  4: Pt able to cylindrical and pincher grasp and hold objects without limitation    LTG (6-8 weeks)  1: Improve FOTO > 60/100  2: Pt able to form full comp flexion  3: Pt able to apply pressure to palm  4: Pt zina to perform all ADL's  5: Pt able to lift and carry items > 10 lbs unilaterally  6:  strength at least 40 lbs  7: Pt able to complete job tasks without  limitation    Plan  Patient would benefit from: skilled physical therapy  Planned modality interventions: TENS and thermotherapy: hydrocollator packs    Planned therapy interventions: joint mobilization, manual therapy, massage, nerve gliding, neuromuscular re-education, patient/caregiver education, strengthening, stretching, therapeutic activities, therapeutic exercise, sensory integrative techniques, fine motor coordination training, flexibility, functional ROM exercises and home exercise program    Frequency: 2x week  Duration in weeks: 8  Plan of Care beginning date: 2024  Plan of Care expiration date: 2024  Treatment plan discussed with: patient  Plan details: Initiate PT as per POC        Subjective Evaluation    History of Present Illness  Date of onset: 2024  Mechanism of injury: trauma  Mechanism of injury: Pt had contusion to R hand while at work went to ER  Never had a prior injury to R hand  Hand has been feeling hot  Unable to use R hand for ADL's, requires help from family  Has to keep moving hand to help relieve her symptoms  Difficulty sleeping at night   Pain gets worse throughout the day  Having L neck/shoulder pain from overusing her L UE for work activities, had to leave work a couple times 2/2 the pain in her L shoulder and neck                Not a recurrent problem   Quality of life: good    Patient Goals  Patient goals for therapy: increased strength, independence with ADLs/IADLs, return to work, decreased pain and increased motion    Pain  Current pain ratin  At worst pain rating: 10  Quality: discomfort, throbbing and radiating  Relieving factors: rest, support and relaxation  Exacerbated by: any movement of digits.  Progression: no change    Social Support    Employment status: working (light duty- moving items, using L hand only)  Hand dominance: right  Exercise history: gym, running          Objective     Observations     Additional Observation Details  Mild edema to  dorsal distal hand along 3rd and 4th MCP joint  Mild eccyhmosis along palmar crease    Tenderness     Additional Tenderness Details  Severe tenderness to palm, hypersensitivity, some diminished light touch    Neurological Testing     Sensation     Wrist/Hand     Right   Diminished: light touch    Comments   Right light touch: 2g to IF, MF, 0.2 g RF, SF    Active Range of Motion     Right Wrist   Wrist flexion: 60 degrees   Wrist extension: 45 degrees     Right Thumb   Flexion     MP: 55    DIP: 65  Palmar Abduction    CMC: 60  Radial Abduction    CMC: 55    Right Digits   Flexion   Index     MCP: 65    PIP: 65    DIP: 35  Middle     MCP: 65    PIP: 85    DIP: 45  Ring     MCP: 5    PIP: 85    DIP: 45  Little     MCP: 40    PIP: 80    DIP: 65    Additional Active Range of Motion Details  Sup/pron full  Pt unable to fully extend digits actively    Passive Range of Motion     Right Wrist   Wrist extension: 55 degrees with pain    Additional Passive Range of Motion Details  Each joint has full mobility- able to move while keeping tendons on slack  Unable to tolerate any composite motion    Swelling     Left Wrist/Hand   Circumference MCP: 17.9 cm    Right Wrist/Hand   Circumference MCP: 18.4 cm             Precautions: DOI 5/6/24  HEP: STM, desensitization activities, digits PROM,     Manuals             MH in extension             STM             PROM digits                          Neuro Re-Ed             Tennis ball rotation             Ball roll for massage                                                                              Ther Ex             Towel bunches             Cones grasping             Wrist AROM with cone                                                                              Ther Activity                                       Gait Training                                       Modalities

## 2024-06-06 PROCEDURE — 97161 PT EVAL LOW COMPLEX 20 MIN: CPT | Performed by: PHYSICAL THERAPIST

## 2024-06-10 ENCOUNTER — OFFICE VISIT (OUTPATIENT)
Dept: PHYSICAL THERAPY | Facility: MEDICAL CENTER | Age: 24
End: 2024-06-10
Payer: OTHER MISCELLANEOUS

## 2024-06-10 DIAGNOSIS — S60.221A CONTUSION OF RIGHT HAND, INITIAL ENCOUNTER: Primary | ICD-10-CM

## 2024-06-10 PROCEDURE — 97140 MANUAL THERAPY 1/> REGIONS: CPT | Performed by: PHYSICAL THERAPIST

## 2024-06-10 PROCEDURE — 97112 NEUROMUSCULAR REEDUCATION: CPT | Performed by: PHYSICAL THERAPIST

## 2024-06-10 PROCEDURE — 97110 THERAPEUTIC EXERCISES: CPT | Performed by: PHYSICAL THERAPIST

## 2024-06-10 NOTE — PROGRESS NOTES
Daily Note     Today's date: 6/10/2024  Patient name: Kori Davis  : 2000  MRN: [id number]  Referring provider: Oneal Sanders MD  Dx:   Encounter Diagnosis     ICD-10-CM    1. Contusion of right hand, initial encounter  S60.221A                      Subjective: Pt reports her fingers and palm are doing better. She has noticed better movement in her digits from performing the exercises. Likes using the rice and sometimes warm water. The middle finger is the worst and the most difficult to stretch.       Objective: See treatment diary below      Assessment: Tolerated treatment well. Patient exhibited good technique with therapeutic exercises and would benefit from continued PT  Initiated program  + MCP joint stiffness  in IF and MF moving into hyperextension abl to reach 0 degrees, moderate long flexor tightness but bulk of limitation is within the joint itself; firmness along volar MCP joints, could be from holding in protective position  Able to passively flex each digit into palm without limitation; no limitation in ROM in RF and SF  Overall, pt as improved significantly in her ROM and pain tolerance.       Plan: Continue per plan of care.      Precautions: DOI 24  HEP: STM, desensitization activities, digits PROM,     Manuals 6/10            MH in extension Rolled in palm            STM 5            PROM digits 15             25            Neuro Re-Ed             Tennis ball rotation 1 round            Ball roll for massage 2 min                                                                 8            Ther Ex             Towel bunches Tissues 2 min            Cones grasping 3 min            Wrist AROM with cone 2x10                                                    10                         Ther Activity                                       Gait Training                                       Modalities

## 2024-06-12 ENCOUNTER — OFFICE VISIT (OUTPATIENT)
Dept: PHYSICAL THERAPY | Facility: MEDICAL CENTER | Age: 24
End: 2024-06-12
Payer: OTHER MISCELLANEOUS

## 2024-06-12 DIAGNOSIS — S60.221A CONTUSION OF RIGHT HAND, INITIAL ENCOUNTER: Primary | ICD-10-CM

## 2024-06-12 PROCEDURE — 97112 NEUROMUSCULAR REEDUCATION: CPT | Performed by: PHYSICAL THERAPIST

## 2024-06-12 PROCEDURE — 97110 THERAPEUTIC EXERCISES: CPT | Performed by: PHYSICAL THERAPIST

## 2024-06-12 PROCEDURE — 97140 MANUAL THERAPY 1/> REGIONS: CPT | Performed by: PHYSICAL THERAPIST

## 2024-06-12 NOTE — PROGRESS NOTES
Daily Note     Today's date: 2024  Patient name: Kori Davis  : 2000  MRN: [id number]  Referring provider: Oneal aSnders MD  Dx:   Encounter Diagnosis     ICD-10-CM    1. Contusion of right hand, initial encounter  S60.221A                      Subjective: Pt reports she is gradually feeling better      Objective: See treatment diary below      Assessment: Tolerated treatment well. Patient exhibited good technique with therapeutic exercises and would benefit from continued PT  Significant improvement in gliding; able to reach full extension of digits- about 80% comp flexion ( post manuals)  Still tension moving IF and MF MCP joints into hyperextension  Pt reported mild tension post session but will continue to stretch the rest of the day.       Plan: Continue per plan of care.      Precautions: DOI 24  HEP: STM, desensitization activities, digits PROM,     Manuals 6/10 6/12           MH in extension Rolled in palm 10           STM 5 5           PROM digits 15 10            25 20           Neuro Re-Ed             Tennis ball rotation 1 round 1 round           Ball roll for massage 2 min 2 min                                                                8 8           Ther Ex             Towel bunches Tissues 2 min Towel 2 min           Cones grasping 3 min Pegs 3 min           Wrist AROM with cone 2x10 2x10           Pegs prehension  3 min                                      10 12                        Ther Activity                                       Gait Training                                       Modalities

## 2024-06-19 ENCOUNTER — OFFICE VISIT (OUTPATIENT)
Dept: PHYSICAL THERAPY | Facility: MEDICAL CENTER | Age: 24
End: 2024-06-19
Payer: OTHER MISCELLANEOUS

## 2024-06-19 DIAGNOSIS — S60.221A CONTUSION OF RIGHT HAND, INITIAL ENCOUNTER: Primary | ICD-10-CM

## 2024-06-19 PROCEDURE — 97110 THERAPEUTIC EXERCISES: CPT | Performed by: PHYSICAL THERAPIST

## 2024-06-19 PROCEDURE — 97112 NEUROMUSCULAR REEDUCATION: CPT | Performed by: PHYSICAL THERAPIST

## 2024-06-19 PROCEDURE — 97140 MANUAL THERAPY 1/> REGIONS: CPT | Performed by: PHYSICAL THERAPIST

## 2024-06-19 NOTE — PROGRESS NOTES
Daily Note     Today's date: 2024  Patient name: Kori Davis  : 2000  MRN: [id number]  Referring provider: Oneal Sanders MD  Dx:   Encounter Diagnosis     ICD-10-CM    1. Contusion of right hand, initial encounter  S60.221A                      Subjective: Pt reports she had some swelling in her R hand over the weekend. Continuing to work on her ROM      Objective: See treatment diary below      Assessment: Tolerated treatment well. Patient exhibited good technique with therapeutic exercises and would benefit from continued PT  Pt still has moderate tension at her 3rd MCP joint, firmness in distal palm. Able to reach 0 degrees of extension passively  No intrinsic tightness, just long flexor and volar MCP joint stiffness  Pt able to tolerate hand being flat on table  Added light resistance to program, pt tolerated well  Pt noted some soreness along distal palm along MF after exercises    Plan: Continue per plan of care.      Precautions: DOI 24  HEP: STM, desensitization activities, digits PROM,     Manuals 6/10 6/12 6/19          MH in extension Rolled in palm 10 10 flat          STM 5 5 5          PROM digits 15 10 10           25 20 20          Neuro Re-Ed             Tennis ball rotation 1 round 1 round 1 round          Ball roll for massage 2 min 2 min 2 min                                                               8 8 8          Ther Ex             Towel bunches Tissues 2 min Towel 2 min Towel 2 min          Cones grasping 3 min Pegs 3 min Pegs 3 min          Wrist AROM with cone 2x10 2x10 1# 2x10          Pegs prehension  3 min 3 min          fingerweb   Yellow 20x                        10 12 12                       Ther Activity                                       Gait Training                                       Modalities

## 2024-06-21 ENCOUNTER — OFFICE VISIT (OUTPATIENT)
Dept: PHYSICAL THERAPY | Facility: MEDICAL CENTER | Age: 24
End: 2024-06-21
Payer: OTHER MISCELLANEOUS

## 2024-06-21 DIAGNOSIS — S60.221A CONTUSION OF RIGHT HAND, INITIAL ENCOUNTER: Primary | ICD-10-CM

## 2024-06-21 PROCEDURE — 97140 MANUAL THERAPY 1/> REGIONS: CPT | Performed by: PHYSICAL THERAPIST

## 2024-06-21 PROCEDURE — 97110 THERAPEUTIC EXERCISES: CPT | Performed by: PHYSICAL THERAPIST

## 2024-06-21 NOTE — PROGRESS NOTES
Daily Note     Today's date: 2024  Patient name: Kori Davis  : 2000  MRN: [id number]  Referring provider: Oneal Sanders MD  Dx:   Encounter Diagnosis     ICD-10-CM    1. Contusion of right hand, initial encounter  S60.221A                      Subjective: Pt reports her fingers are doing better. Concerned about rotation of IF at PIP joint.      Objective: See treatment diary below      Assessment: Tolerated treatment well. Patient exhibited good technique with therapeutic exercises and would benefit from continued PT  IF PIP joint has mild deviation ulnarly, asymmetrical from L hand  Pt still has moderate stiffness in 3rd MCP joint into hyperextension able to reach 10-15 degrees  Added more light strengthening to program, pt had some soreness but able to complete      Plan: Continue per plan of care.      Precautions: DOI 24  HEP: STM, desensitization activities, digits PROM,     Manuals 6/10 6/12 6/19 6/21         MH in extension Rolled in palm 10 10 flat 10 flat         STM 5 5 5 5         PROM digits 15 10 10 10          25 20 20 20         Neuro Re-Ed             Tennis ball rotation 1 round 1 round 1 round          Ball roll for massage 2 min 2 min 2 min NP                                                              8 8 8          Ther Ex             Towel bunches Tissues 2 min Towel 2 min Towel 2 min Towel 2 min         Cones grasping 3 min Pegs 3 min Pegs 3 min Pegs 3 min         Wrist AROM with cone 2x10 2x10 1# 2x10 1# 2x10         Pegs prehension  3 min 3 min 3 min         fingerweb   Yellow 20x Yellow 20x         digiweb    Light green 30x         Digiball puppet hand    Yellow 20x          10 12 12 20                      Ther Activity                                       Gait Training                                       Modalities

## 2024-06-25 ENCOUNTER — OFFICE VISIT (OUTPATIENT)
Dept: PHYSICAL THERAPY | Facility: MEDICAL CENTER | Age: 24
End: 2024-06-25
Payer: OTHER MISCELLANEOUS

## 2024-06-25 DIAGNOSIS — S60.221A CONTUSION OF RIGHT HAND, INITIAL ENCOUNTER: Primary | ICD-10-CM

## 2024-06-25 PROCEDURE — 97110 THERAPEUTIC EXERCISES: CPT | Performed by: PHYSICAL THERAPIST

## 2024-06-25 PROCEDURE — 97140 MANUAL THERAPY 1/> REGIONS: CPT | Performed by: PHYSICAL THERAPIST

## 2024-06-25 NOTE — PROGRESS NOTES
Daily Note     Today's date: 2024  Patient name: Kori Davis  : 2000  MRN: [id number]  Referring provider: Oneal Sanders MD  Dx:   Encounter Diagnosis     ICD-10-CM    1. Contusion of right hand, initial encounter  S60.221A                      Subjective: Pt reports she feels she is getting better. Wondering if she can do without her brace, really hot when wearing it.       Objective: See treatment diary below      Assessment: Tolerated treatment well. Patient exhibited good technique with therapeutic exercises and would benefit from continued PT  Pt has softer feel to scar tissue along distal palm, still moderate stiffness at her 3rd MCP joint especially moving into hyperextension. Pt able to tolerate firmer pressure during STM  Discussed weaning from brace. Fabricated RMO orthosis to move her MF into more extension relative to adjacent digits to help stretch tissues in her distal palm, pt comfortable with fit. Able to perform some of her exercises while wearing.  No complaints post session    Plan: Continue per plan of care.      Precautions: DOI 24  HEP: STM, desensitization activities, digits PROM,     Manuals 6/10 6/12 6/19 6/21 6/25        MH in extension Rolled in palm 10 10 flat 10 flat 10 flat        STM 5 5 5 5 5        PROM digits 15 10 10 10 5         25 20 20 20 20        Neuro Re-Ed             Tennis ball rotation 1 round 1 round 1 round          Ball roll for massage 2 min 2 min 2 min NP                                                              8 8 8          Ther Ex             Towel bunches Tissues 2 min Towel 2 min Towel 2 min Towel 2 min Towel 2 min        Cones grasping 3 min Pegs 3 min Pegs 3 min Pegs 3 min Pegs 3 min        Wrist AROM with cone 2x10 2x10 1# 2x10 1# 2x10 2# 2x10        Pegs prehension  3 min 3 min 3 min 3 min        fingerweb   Yellow 20x Yellow 20x  Yellow 30x        digiweb    Light green 30x Light green 30x        Digiball puppet hand    Yellow  20x Yellow 20x         10 12 12 20 20                     Ther Activity                                       Gait Training                                       Modalities

## 2024-06-27 ENCOUNTER — OFFICE VISIT (OUTPATIENT)
Dept: PHYSICAL THERAPY | Facility: MEDICAL CENTER | Age: 24
End: 2024-06-27
Payer: OTHER MISCELLANEOUS

## 2024-06-27 DIAGNOSIS — S60.221A CONTUSION OF RIGHT HAND, INITIAL ENCOUNTER: Primary | ICD-10-CM

## 2024-06-27 PROCEDURE — 97110 THERAPEUTIC EXERCISES: CPT | Performed by: PHYSICAL THERAPIST

## 2024-06-27 PROCEDURE — 97140 MANUAL THERAPY 1/> REGIONS: CPT | Performed by: PHYSICAL THERAPIST

## 2024-06-27 NOTE — PROGRESS NOTES
Daily Note     Today's date: 2024  Patient name: Kori Davis  : 2000  MRN: [id number]  Referring provider: Oneal Sanders MD  Dx:   Encounter Diagnosis     ICD-10-CM    1. Contusion of right hand, initial encounter  S60.221A                      Subjective: Pt reports her hand feel overall less hot and swollen. Still tight when trying to fully extend her digits. No issue with RMO.      Objective: See treatment diary below      Assessment: Tolerated treatment well. Patient exhibited good technique with therapeutic exercises and would benefit from continued PT  Pt has decreased firmness to her soft tissue in her palm, localized more along DPC of 3rd digit  Still firmness in her MCP joint moving into hyperextension, decreased long flexor tightness. Limitation more at joint itself; able to move passively joint into about 15-20 degree of hyperextension independently  No complaints post session    Plan: Continue per plan of care.      Precautions: DOI 24  HEP: STM, desensitization activities, digits PROM,     Manuals 6/10 6/12 6/19 6/21 6/25 6/27       MH in extension Rolled in palm 10 10 flat 10 flat 10 flat 10 flate       STM 5 5 5 5 5 5       PROM digits 15 10 10 10 5 5        25 20 20 20 20 20       Neuro Re-Ed             Tennis ball rotation 1 round 1 round 1 round          Ball roll for massage 2 min 2 min 2 min NP                                                              8 8 8          Ther Ex             Towel bunches Tissues 2 min Towel 2 min Towel 2 min Towel 2 min Towel 2 min Ball roll fr stretch 2 min       Cones grasping 3 min Pegs 3 min Pegs 3 min Pegs 3 min Pegs 3 min Skinny pegs       Wrist AROM with cone 2x10 2x10 1# 2x10 1# 2x10 2# 2x10 2# 2x10       Pegs prehension  3 min 3 min 3 min 3 min 3 min       fingerweb   Yellow 20x Yellow 20x  Yellow 30x Red 20x       digiweb    Light green 30x Light green 30x Light green 30x       Digiball puppet hand    Yellow 20x Yellow 20x Red  30x        10 12 12 20 20 20                    Ther Activity                                       Gait Training                                       Modalities

## 2024-06-28 ENCOUNTER — HOSPITAL ENCOUNTER (OUTPATIENT)
Dept: RADIOLOGY | Facility: HOSPITAL | Age: 24
End: 2024-06-28
Attending: STUDENT IN AN ORGANIZED HEALTH CARE EDUCATION/TRAINING PROGRAM
Payer: OTHER MISCELLANEOUS

## 2024-06-28 ENCOUNTER — OFFICE VISIT (OUTPATIENT)
Dept: OBGYN CLINIC | Facility: CLINIC | Age: 24
End: 2024-06-28
Payer: OTHER MISCELLANEOUS

## 2024-06-28 VITALS — WEIGHT: 136 LBS

## 2024-06-28 DIAGNOSIS — S60.221A CONTUSION OF RIGHT HAND, INITIAL ENCOUNTER: ICD-10-CM

## 2024-06-28 DIAGNOSIS — S60.221A CONTUSION OF RIGHT HAND, INITIAL ENCOUNTER: Primary | ICD-10-CM

## 2024-06-28 PROCEDURE — 73130 X-RAY EXAM OF HAND: CPT

## 2024-06-28 PROCEDURE — 99213 OFFICE O/P EST LOW 20 MIN: CPT | Performed by: STUDENT IN AN ORGANIZED HEALTH CARE EDUCATION/TRAINING PROGRAM

## 2024-06-28 NOTE — LETTER
June 28, 2024     Patient: Kori Davis  YOB: 2000  Date of Visit: 6/28/2024      To Whom it May Concern:    Kori Davis is under my professional care. Kori was seen in my office on 6/28/2024. Kori may return to work with no use of her right hand.     If you have any questions or concerns, please don't hesitate to call.         Sincerely,          Oneal Sanders MD

## 2024-06-28 NOTE — PROGRESS NOTES
ORTHOPAEDIC HAND, WRIST, AND ELBOW OFFICE  VISIT      ASSESSMENT/PLAN:      Diagnoses and all orders for this visit:    Contusion of right hand, initial encounter  -     XR hand 3+ vw right; Future          23 y.o. female with right hand contusion sustained at work 5/6/2024     The patient has improved from prior examination. X-rays were reviewed which demonstrate no fractures or dislocations. The patient was instructed to continue her efforts at formal therapy. She was instructed to work on scar massage into the palm.     Follow Up:  6 weeks       To Do Next Visit:  Re-evaluation of current issue            Oneal Sanders MD  Attending, Orthopaedic Surgery  Hand, Wrist, and Elbow Surgery  [facility]    ______________________________________________________________________________________________    CHIEF COMPLAINT:  Chief Complaint   Patient presents with   • Right Wrist - Follow-up       SUBJECTIVE:  Patient is a 23 y.o. RHD female who presents today for follow up of right hand contusion sustained at work 5/6/2024.  The patient states she is doing better. She has been attending formal therapy and making progress. She states her pain is improved some. She notes pain into her index and long finger. She also notes some pain into the palm.     Occupation: Makes housing/bolts      I have personally reviewed all the relevant PMH, PSH, SH, FH, Medications and allergies      PAST MEDICAL HISTORY:  Past Medical History:   Diagnosis Date   • Disease of thyroid gland        PAST SURGICAL HISTORY:  History reviewed. No pertinent surgical history.    FAMILY HISTORY:  History reviewed. No pertinent family history.    SOCIAL HISTORY:  Social History     Tobacco Use   • Smoking status: Never   • Smokeless tobacco: Never   Substance Use Topics   • Alcohol use: Never   • Drug use: Never       MEDICATIONS:    Current Outpatient Medications:   •  [employer] Purelax 17 GM/SCOOP powder, TAKE 17 G BY MOUTH 2 (TWO)  "TIMES A DAY. MIX 1 HEAPING TABLESPOON (17 G) WITH 8 OUNCES OF WATER. PREPARE AND DRINK SOLUTION ONCE DAILY., Disp: , Rfl:   •  [employer] Senna 8.6 MG tablet, TAKE 1 TABLET (8.6 MG TOTAL) BY MOUTH DAILY AS NEEDED FOR CONSTIPATION (IF NO BM FOR 1 DAY)., Disp: , Rfl:   •  Dextromethorphan-guaiFENesin  MG/5ML LIQD, Take by mouth every 6 (six) hours, Disp: , Rfl:   •  diclofenac (VOLTAREN) 75 mg EC tablet, Take 75 mg by mouth 2 (two) times a day with meals, Disp: , Rfl:   •  fluticasone (FLONASE) 50 mcg/act nasal spray, spray 2 sprays into each nostril every day, Disp: , Rfl:   •  Lety FE 1.5/30 1.5-30 MG-MCG tablet, TAKE 1 TABLET BY MOUTH EVERY DAY FOR BIRTH CONTROL., Disp: , Rfl:   •  levothyroxine 50 mcg tablet, Take 50 mcg by mouth daily, Disp: , Rfl:   •  methocarbamol (ROBAXIN) 500 mg tablet, Take 1 tablet (500 mg total) by mouth 2 (two) times a day, Disp: 30 tablet, Rfl: 0  •  phentermine 37.5 MG capsule, Take 37.5 mg by mouth, Disp: , Rfl:   •  topiramate (TOPAMAX) 25 mg tablet, Take 25 mg by mouth 2 (two) times a day, Disp: , Rfl:   •  ibuprofen (MOTRIN) 400 mg tablet, Take 1 tablet (400 mg total) by mouth every 6 (six) hours as needed for mild pain for up to 3 days, Disp: 12 tablet, Rfl: 0    ALLERGIES:  No Known Allergies        REVIEW OF SYSTEMS:  Musculoskeletal:        As noted in HPI.   All other systems reviewed and are negative.    VITALS:  There were no vitals filed for this visit.    LABS:  HgA1c: No results found for: \"HGBA1C\"  BMP:   Lab Results   Component Value Date    CALCIUM 9.1 12/02/2022    K 3.8 12/02/2022    CO2 22 (L) 12/02/2022     12/02/2022    BUN 9 12/02/2022    CREATININE 0.81 12/02/2022       _____________________________________________________  PHYSICAL EXAMINATION:  General: Well developed and well nourished, alert & oriented x 3, appears comfortable  Psychiatric: Normal  HEENT: Normocephalic, Atraumatic Trachea Midline, No torticollis  Pulmonary: No audible wheezing or " respiratory distress   Abdomen/GI: Non tender, non distended   Cardiovascular: No pitting edema, 2+ radial pulse   Skin: No masses, erythema, lacerations, fluctation, ulcerations  Neurovascular: Sensation Intact to the Median, Ulnar, Radial Nerve, Motor Intact to the Median, Ulnar, Radial Nerve, and Pulses Intact  Musculoskeletal: Normal, except as noted in detailed exam and in HPI.      MUSCULOSKELETAL EXAMINATION:  Right hand   Pulp to palm 1.5 cm  Ulnar deviation deformity of index finger  Full digit extension  No tenderness to index finger PIP or proximal or distal phalanx  Tenderness to palm of hand over index and middle metacarpals    ___________________________________________________  STUDIES REVIEWED:  Xrays of the right hand were reviewed and independently interpreted in PACS by Dr. Sanders and demonstrate right index finger bony prominence proximal phalanx over radial aspect. No fractures or dislocations.        PROCEDURES PERFORMED:  Procedures  No Procedures performed today    _____________________________________________________      Scribe Attestation    I,:  Princess Copeland MA am acting as a scribe while in the presence of the attending physician.:       I,:  Oneal Sanders MD personally performed the services described in this documentation    as scribed in my presence.:

## 2024-07-01 ENCOUNTER — APPOINTMENT (OUTPATIENT)
Dept: PHYSICAL THERAPY | Facility: MEDICAL CENTER | Age: 24
End: 2024-07-01
Payer: OTHER MISCELLANEOUS

## 2024-07-03 ENCOUNTER — OFFICE VISIT (OUTPATIENT)
Dept: PHYSICAL THERAPY | Facility: MEDICAL CENTER | Age: 24
End: 2024-07-03
Payer: OTHER MISCELLANEOUS

## 2024-07-03 DIAGNOSIS — S60.221A CONTUSION OF RIGHT HAND, INITIAL ENCOUNTER: Primary | ICD-10-CM

## 2024-07-03 PROCEDURE — 97140 MANUAL THERAPY 1/> REGIONS: CPT | Performed by: PHYSICAL THERAPIST

## 2024-07-03 PROCEDURE — 97110 THERAPEUTIC EXERCISES: CPT | Performed by: PHYSICAL THERAPIST

## 2024-07-03 NOTE — PROGRESS NOTES
Daily Note     Today's date: 7/3/2024  Patient name: Kori Davis  : 2000  MRN: [id number]  Referring provider: Oneal Sanders MD  Dx:   Encounter Diagnosis     ICD-10-CM    1. Contusion of right hand, initial encounter  S60.221A                      Subjective: Pt reports she saw , decided to give hear few more week of therapy to get her stronger and ready to return to her full duty.      Objective: See treatment diary below      Assessment: Tolerated treatment well. Patient exhibited good technique with therapeutic exercises and would benefit from continued PT  Significant improvement in hyperextension at MCP joint able to reach 30 degrees passively  Discussed with pt that her motion has been improving well, will continue to work on strengthening  Added putty for HEP    Plan: Continue per plan of care.      Precautions: DOI 24  HEP: STM, desensitization activities, digits PROM, (L2) putty roll/pinch, putty digit abd/add    Manuals 6/10 6/12 6/19 6/21 6/25 6/27 7/3      MH in extension Rolled in palm 10 10 flat 10 flat 10 flat 10 flat 10 flat      STM 5 5 5 5 5 5 5      PROM digits 15 10 10 10 5 5 5       25 20 20 20 20 20 20      Neuro Re-Ed             Tennis ball rotation 1 round 1 round 1 round          Ball roll for massage 2 min 2 min 2 min NP                                                              8 8 8          Ther Ex             Towel bunches Tissues 2 min Towel 2 min Towel 2 min Towel 2 min Towel 2 min Ball roll fr stretch 2 min 2 min      Cones grasping 3 min Pegs 3 min Pegs 3 min Pegs 3 min Pegs 3 min Skinny pegs Skinny pegs 3 min      Wrist AROM with cone 2x10 2x10 1# 2x10 1# 2x10 2# 2x10 2# 2x10 3# 2x10      Pegs prehension  3 min 3 min 3 min 3 min 3 min       fingerweb   Yellow 20x Yellow 20x  Yellow 30x Red 20x Red 20x      digiweb    Light green 30x Light green 30x Light green 30x Light green 30x      Digiball puppet hand    Yellow 20x Yellow 20x Red 30x  Red 30x       10  12 12 20 20 20 20                   Ther Activity                                       Gait Training                                       Modalities

## 2024-07-09 ENCOUNTER — OFFICE VISIT (OUTPATIENT)
Dept: PHYSICAL THERAPY | Facility: MEDICAL CENTER | Age: 24
End: 2024-07-09
Payer: OTHER MISCELLANEOUS

## 2024-07-09 DIAGNOSIS — S60.221A CONTUSION OF RIGHT HAND, INITIAL ENCOUNTER: Primary | ICD-10-CM

## 2024-07-09 PROCEDURE — 97140 MANUAL THERAPY 1/> REGIONS: CPT | Performed by: PHYSICAL THERAPIST

## 2024-07-09 PROCEDURE — 97110 THERAPEUTIC EXERCISES: CPT | Performed by: PHYSICAL THERAPIST

## 2024-07-09 NOTE — PROGRESS NOTES
Daily Note     Today's date: 2024  Patient name: Kori Davis  : 2000  MRN: [id number]  Referring provider: Oneal Sanders MD  Dx:   Encounter Diagnosis     ICD-10-CM    1. Contusion of right hand, initial encounter  S60.221A                      Subjective: Pt reports she felt some shaking in her R hand the other day, short lived. Pt has increased using her R hand but still using it for light activities only.       Objective: See treatment diary below      Assessment: Tolerated treatment well. Patient exhibited good technique with therapeutic exercises and would benefit from continued PT  Discussed with pt she could have been dehrydrated from the intense heat the past few days that caused some of the shaking in her hand  ROM stable, only mild tension moving MF into hyperextension  Increased resistance to program, pt tolerated well    Plan: Continue per plan of care.      Precautions: DOI 24  HEP: STM, desensitization activities, digits PROM, (L2) putty roll/pinch, putty digit abd/add    Manuals 6/10 6/12 6/19 6/21 6/25 6/27 7/3 7/9     MH in extension Rolled in palm 10 10 flat 10 flat 10 flat 10 flat 10 flat 10     STM 5 5 5 5 5 5 5 5     PROM digits 15 10 10 10 5 5 5 5      25 20 20 20 20 20 20 20     Neuro Re-Ed             Tennis ball rotation 1 round 1 round 1 round          Ball roll for massage 2 min 2 min 2 min NP                                                              8 8 8          Ther Ex             Towel bunches Tissues 2 min Towel 2 min Towel 2 min Towel 2 min Towel 2 min Ball roll fr stretch 2 min 2 min 3 min     Cones grasping 3 min Pegs 3 min Pegs 3 min Pegs 3 min Pegs 3 min Skinny pegs Skinny pegs 3 min Skinny pegs 3 min     Wrist AROM with cone 2x10 2x10 1# 2x10 1# 2x10 2# 2x10 2# 2x10 3# 2x10 3# 2x10     Pegs prehension  3 min 3 min 3 min 3 min 3 min       fingerweb   Yellow 20x Yellow 20x  Yellow 30x Red 20x Red 20x Green 20x     digiweb    Light green 30x Light green  30x Light green 30x Light green 30x Yellow 30x     Digiball puppet hand    Yellow 20x Yellow 20x Red 30x  Red 30x Red 30x      10 12 12 20 20 20 20 20                  Ther Activity                                       Gait Training                                       Modalities

## 2024-07-11 ENCOUNTER — OFFICE VISIT (OUTPATIENT)
Dept: PHYSICAL THERAPY | Facility: MEDICAL CENTER | Age: 24
End: 2024-07-11
Payer: OTHER MISCELLANEOUS

## 2024-07-11 DIAGNOSIS — S60.221A CONTUSION OF RIGHT HAND, INITIAL ENCOUNTER: Primary | ICD-10-CM

## 2024-07-11 PROCEDURE — 97110 THERAPEUTIC EXERCISES: CPT | Performed by: PHYSICAL THERAPIST

## 2024-07-11 PROCEDURE — 97140 MANUAL THERAPY 1/> REGIONS: CPT | Performed by: PHYSICAL THERAPIST

## 2024-07-11 NOTE — PROGRESS NOTES
Daily Note     Today's date: 2024  Patient name: Kori Davis  : 2000  MRN: [id number]  Referring provider: Oneal Sanders MD  Dx:   Encounter Diagnosis     ICD-10-CM    1. Contusion of right hand, initial encounter  S60.221A                      Subjective: Pt reports her finger and palm are doing better.  to volar 3rd MCP      Objective: See treatment diary below      Assessment: Tolerated treatment well. Patient exhibited good technique with therapeutic exercises and would benefit from continued PT  Pt has good mobility, mild long flexor tightness  Pt tolerated session well.  Advised her to gradually reduce wearing RMO, pt understood    Plan: Continue per plan of care.      Precautions: DOI 24  HEP: STM, desensitization activities, digits PROM, (L2) putty roll/pinch, putty digit abd/add    Manuals 6/10 6/12 6/19 6/21 6/25 6/27 7/3 7/9 7/11    MH in extension Rolled in palm 10 10 flat 10 flat 10 flat 10 flat 10 flat 10 10    STM 5 5 5 5 5 5 5 5 5    PROM digits 15 10 10 10 5 5 5 5 5     25 20 20 20 20 20 20 20 20    Neuro Re-Ed             Tennis ball rotation 1 round 1 round 1 round          Ball roll for massage 2 min 2 min 2 min NP                                                              8 8 8          Ther Ex             Towel bunches Tissues 2 min Towel 2 min Towel 2 min Towel 2 min Towel 2 min Ball roll fr stretch 2 min 2 min 3 min 2 min 2.5#    Cones grasping 3 min Pegs 3 min Pegs 3 min Pegs 3 min Pegs 3 min Skinny pegs Skinny pegs 3 min Skinny pegs 3 min Skinny pegs 3 min    Wrist AROM with cone 2x10 2x10 1# 2x10 1# 2x10 2# 2x10 2# 2x10 3# 2x10 3# 2x10 3# 2x10    Pegs prehension  3 min 3 min 3 min 3 min 3 min       fingerweb   Yellow 20x Yellow 20x  Yellow 30x Red 20x Red 20x Green 20x Green 30x    digiweb    Light green 30x Light green 30x Light green 30x Light green 30x Yellow 30x  Yellow 30x    Digiball puppet hand    Yellow 20x Yellow 20x Red 30x  Red 30x Red 30x   Red 30x     10 12 12 20 20 20 20 20 20                 Ther Activity                                       Gait Training                                       Modalities

## 2024-07-15 ENCOUNTER — OFFICE VISIT (OUTPATIENT)
Dept: PHYSICAL THERAPY | Facility: MEDICAL CENTER | Age: 24
End: 2024-07-15
Payer: OTHER MISCELLANEOUS

## 2024-07-15 DIAGNOSIS — S60.221A CONTUSION OF RIGHT HAND, INITIAL ENCOUNTER: Primary | ICD-10-CM

## 2024-07-15 PROCEDURE — 97110 THERAPEUTIC EXERCISES: CPT | Performed by: PHYSICAL THERAPIST

## 2024-07-15 PROCEDURE — 97140 MANUAL THERAPY 1/> REGIONS: CPT | Performed by: PHYSICAL THERAPIST

## 2024-07-15 NOTE — PROGRESS NOTES
Daily Note     Today's date: 7/15/2024  Patient name: Kori Davis  : 2000  MRN: [id number]  Referring provider: Oneal Sanders MD  Dx:   Encounter Diagnosis     ICD-10-CM    1. Contusion of right hand, initial encounter  S60.221A                      Subjective: Pt reports she still has pain along her volar MCP joint. Feels like her MF is about the same moving into extension        Objective: See treatment diary below      Assessment: Tolerated treatment well. Patient exhibited good technique with therapeutic exercises and would benefit from continued PT  Kept program stable  Pt has gradual improvement in hyperextension at MCP joint, still some stiffness at end range 20-25 degrees  Pt able to hold    II R/L 35/70 lbs  2pt pinch 6/ 13 lbs  Encouraged Pt to continue with massaging scar and increasing use of R hand. Pt understood.     Plan: Continue per plan of care.      Precautions: DOI 24  HEP: STM, desensitization activities, digits PROM, (L2) putty roll/pinch, putty digit abd/add    Manuals 6/10 6/12 6/19 6/21 6/25 6/27 7/3 7/9 7/11 7/15   MH in extension Rolled in palm 10 10 flat 10 flat 10 flat 10 flat 10 flat 10 10 10   STM 5 5 5 5 5 5 5 5 5 5   PROM digits 15 10 10 10 5 5 5 5 5 5    25 20 20 20 20 20 20 20 20 20   Neuro Re-Ed             Tennis ball rotation 1 round 1 round 1 round          Ball roll for massage 2 min 2 min 2 min NP                                                              8 8 8          Ther Ex             Towel bunches Tissues 2 min Towel 2 min Towel 2 min Towel 2 min Towel 2 min Ball roll fr stretch 2 min 2 min 3 min 2 min 2.5# 2 min 2.5#   Cones grasping 3 min Pegs 3 min Pegs 3 min Pegs 3 min Pegs 3 min Skinny pegs Skinny pegs 3 min Skinny pegs 3 min Skinny pegs 3 min Skinny pegs 3 min   Wrist AROM with cone 2x10 2x10 1# 2x10 1# 2x10 2# 2x10 2# 2x10 3# 2x10 3# 2x10 3# 2x10 3# 2x10   Pegs prehension  3 min 3 min 3 min 3 min 3 min       fingerweb   Yellow 20x  Yellow 20x  Yellow 30x Red 20x Red 20x Green 20x Green 30x Green 30x   digiweb    Light green 30x Light green 30x Light green 30x Light green 30x Yellow 30x  Yellow 30x Yellow 30x   Digiball puppet hand    Yellow 20x Yellow 20x Red 30x  Red 30x Red 30x  Red 30x Red 30x    10 12 12 20 20 20 20 20 20 20                Ther Activity                                       Gait Training                                       Modalities

## 2024-07-16 ENCOUNTER — APPOINTMENT (OUTPATIENT)
Dept: PHYSICAL THERAPY | Facility: MEDICAL CENTER | Age: 24
End: 2024-07-16
Payer: OTHER MISCELLANEOUS

## 2024-07-18 ENCOUNTER — OFFICE VISIT (OUTPATIENT)
Dept: PHYSICAL THERAPY | Facility: MEDICAL CENTER | Age: 24
End: 2024-07-18
Payer: OTHER MISCELLANEOUS

## 2024-07-18 DIAGNOSIS — S60.221A CONTUSION OF RIGHT HAND, INITIAL ENCOUNTER: Primary | ICD-10-CM

## 2024-07-18 PROCEDURE — 97110 THERAPEUTIC EXERCISES: CPT | Performed by: PHYSICAL THERAPIST

## 2024-07-18 PROCEDURE — 97140 MANUAL THERAPY 1/> REGIONS: CPT | Performed by: PHYSICAL THERAPIST

## 2024-07-18 NOTE — PROGRESS NOTES
Daily Note     Today's date: 2024  Patient name: Kori Davis  : 2000  MRN: [id number]  Referring provider: Oneal Sanders MD  Dx:   Encounter Diagnosis     ICD-10-CM    1. Contusion of right hand, initial encounter  S60.221A                      Subjective: Pt reports her finger is doing better      Objective: See treatment diary below      Assessment: Tolerated treatment well. Patient exhibited good technique with therapeutic exercises and would benefit from continued PT  Pt had good hyperextension of all digits together, still slight tension when moving MF into isolated hyperextension   Overall, pt is improving in her tolerance to pressure to palm and increased resistance    Plan: Continue per plan of care.      Precautions: DOI 24  HEP: STM, desensitization activities, digits PROM, (L2) putty roll/pinch, putty digit abd/add    Manuals 7/18         7/15   MH in extension 10         10   STM 5         5   PROM digits 5         5    20         20   Neuro Re-Ed                                       Ther Ex             Towel bunches 2.5 2 min         2 min 2.5#   Skinny pegs grasping 3 min         Skinny pegs 3 min   Wrist PRE's 3# 2x10         3# 2x10   clothespins Red/green 4 min            fingerweb Green 30x         Green 30x   digiweb Yellow 30x         Yellow 30x   Digiball puppet hand Red 30x         Red 30x             20    20            Ther Activity                                       Gait Training                                       Modalities

## 2024-07-23 ENCOUNTER — OFFICE VISIT (OUTPATIENT)
Dept: PHYSICAL THERAPY | Facility: MEDICAL CENTER | Age: 24
End: 2024-07-23
Payer: OTHER MISCELLANEOUS

## 2024-07-23 DIAGNOSIS — S60.221A CONTUSION OF RIGHT HAND, INITIAL ENCOUNTER: Primary | ICD-10-CM

## 2024-07-23 PROCEDURE — 97140 MANUAL THERAPY 1/> REGIONS: CPT | Performed by: PHYSICAL THERAPIST

## 2024-07-23 PROCEDURE — 97110 THERAPEUTIC EXERCISES: CPT | Performed by: PHYSICAL THERAPIST

## 2024-07-23 NOTE — PROGRESS NOTES
Daily Note     Today's date: 2024  Patient name: Kori Davis  : 2000  MRN: [id number]  Referring provider: Oneal Sanders MD  Dx:   Encounter Diagnosis     ICD-10-CM    1. Contusion of right hand, initial encounter  S60.221A                      Subjective: Pt reports her finger is doing better.       Objective: See treatment diary below      Assessment: Tolerated treatment well. Patient exhibited good technique with therapeutic exercises and would benefit from continued PT  Very minimal stiffness moving 3rd MCP joint into hyperextension  Pt tolerated session well  Pt overall is progressing well, able to tolerate higher resistances. Possible D/C in 2-3 visits.     Plan: Continue per plan of care.      Precautions: DOI 24  HEP: STM, desensitization activities, digits PROM, (L2) putty roll/pinch, putty digit abd/add    Manuals            MH in extension 10 10           STM 5 5           PROM digits 5 5            20 20           Neuro Re-Ed                                       Ther Ex             Towel bunches 2.5#  2 min 2.5# 2 min           Skinny pegs grasping 3 min  3 min           Wrist PRE's 3# 2x10 3# 2x10           clothespins Red/green 4 min  Green/blue 4 min           fingerweb Green 30x  Green 30x           digiweb Yellow 30x  Yellow 30x           Digiball puppet hand Red 30x Red 30x                         20 20           Ther Activity                                       Gait Training                                       Modalities

## 2024-07-25 ENCOUNTER — OFFICE VISIT (OUTPATIENT)
Dept: PHYSICAL THERAPY | Facility: MEDICAL CENTER | Age: 24
End: 2024-07-25
Payer: OTHER MISCELLANEOUS

## 2024-07-25 DIAGNOSIS — S60.221A CONTUSION OF RIGHT HAND, INITIAL ENCOUNTER: Primary | ICD-10-CM

## 2024-07-25 PROCEDURE — 97140 MANUAL THERAPY 1/> REGIONS: CPT | Performed by: PHYSICAL THERAPIST

## 2024-07-25 PROCEDURE — 97110 THERAPEUTIC EXERCISES: CPT | Performed by: PHYSICAL THERAPIST

## 2024-07-25 NOTE — PROGRESS NOTES
Daily Note     Today's date: 2024  Patient name: Kori Davis  : 2000  MRN: [id number]  Referring provider: Oneal Sanders MD  Dx:   Encounter Diagnosis     ICD-10-CM    1. Contusion of right hand, initial encounter  S60.221A                      Subjective: Pt reports her finger is still sore but doing better      Objective: See treatment diary below      Assessment: Tolerated treatment well. Patient exhibited good technique with therapeutic exercises and would benefit from continued PT  AROM WNL  Mild intrinsic tightness moving 3rd MCP into hyperextension  Able to reach 20 degrees of hyperextension passively  Mild tenderness to volar 3rd MCP joint  Mild pain with strengthening ex but able to complete    Plan: Continue per plan of care.      Precautions: DOI 24  HEP: STM, desensitization activities, digits PROM, (L2) putty roll/pinch, putty digit abd/add    Manuals           MH in extension 10 10 10          STM 5 5 5          PROM digits 5 5 5           20 20 20          Neuro Re-Ed                                       Ther Ex             Towel bunches 2.5#  2 min 2.5# 2 min 2.5# 2 min          Skinny pegs grasping 3 min  3 min 3 min          Wrist PRE's 3# 2x10 3# 2x10 3# 2x10          clothespins Red/green 4 min  Green/blue 4 min  Green/blue 4 min          fingerweb Green 30x  Green 30x Green 30x          digiweb Yellow 30x  Yellow 30x Yellow 30x          Digiball puppet hand Red 30x Red 30x  Red 30x                        20 20 20          Ther Activity                                       Gait Training                                       Modalities

## 2024-07-30 ENCOUNTER — OFFICE VISIT (OUTPATIENT)
Dept: PHYSICAL THERAPY | Facility: MEDICAL CENTER | Age: 24
End: 2024-07-30
Payer: OTHER MISCELLANEOUS

## 2024-07-30 DIAGNOSIS — S60.221A CONTUSION OF RIGHT HAND, INITIAL ENCOUNTER: Primary | ICD-10-CM

## 2024-07-30 PROCEDURE — 97140 MANUAL THERAPY 1/> REGIONS: CPT | Performed by: PHYSICAL THERAPIST

## 2024-07-30 PROCEDURE — 97110 THERAPEUTIC EXERCISES: CPT | Performed by: PHYSICAL THERAPIST

## 2024-07-30 NOTE — PROGRESS NOTES
Daily Note     Today's date: 2024  Patient name: Kori Davis  : 2000  MRN: [id number]  Referring provider: Oneal Sanders MD  Dx:   Encounter Diagnosis     ICD-10-CM    1. Contusion of right hand, initial encounter  S60.221A                      Subjective: Pt reports she had increased pain over the weekend. Unsure if she was doing too much. Can't take too much medication for her pain because of her sensitivity stomach.       Objective: See treatment diary below      Assessment: Tolerated treatment well. Patient exhibited good technique with therapeutic exercises and would benefit from continued PT  Pt has close to full ROM in her MF, mild intrinsic tightness and moving digit into hyperextension at MCP joint; palm softening overall  Able to form full comp fist and extension actively   No issues with completing program today, mild soreness after  Discussed with Pt her progress in flexibility, ROM, and strength.     Plan: Continue per plan of care.      Precautions: DOI 24  HEP: STM, desensitization activities, digits PROM, (L2) putty roll/pinch, putty digit abd/add    Manuals          MH in extension 10 10 10 10         STM 5 5 5 5         PROM digits 5 5 5 5          20 20 20 20         Neuro Re-Ed                                       Ther Ex             Towel bunches 2.5#  2 min 2.5# 2 min 2.5# 2 min 2.5# 2 min         Skinny pegs grasping 3 min  3 min 3 min 3 min         Wrist PRE's 3# 2x10 3# 2x10 3# 2x10 3# 2x10         clothespins Red/green 4 min  Green/blue 4 min  Green/blue 4 min Green/blue 4 min         fingerweb Green 30x  Green 30x Green 30x Green 30x         digiweb Yellow 30x  Yellow 30x Yellow 30x  Yellow 30x         Digiball puppet hand Red 30x Red 30x  Red 30x Red 30x                       20 20 20 20         Ther Activity                                       Gait Training                                       Modalities

## 2024-08-01 ENCOUNTER — OFFICE VISIT (OUTPATIENT)
Dept: PHYSICAL THERAPY | Facility: MEDICAL CENTER | Age: 24
End: 2024-08-01
Payer: OTHER MISCELLANEOUS

## 2024-08-01 DIAGNOSIS — S60.221A CONTUSION OF RIGHT HAND, INITIAL ENCOUNTER: Primary | ICD-10-CM

## 2024-08-01 PROCEDURE — 97110 THERAPEUTIC EXERCISES: CPT | Performed by: PHYSICAL THERAPIST

## 2024-08-01 PROCEDURE — 97140 MANUAL THERAPY 1/> REGIONS: CPT | Performed by: PHYSICAL THERAPIST

## 2024-08-01 NOTE — PROGRESS NOTES
Daily Note/Re-evaluation     Today's date: 2024  Patient name: Kori Davis  : 2000  MRN: [id number]  Referring provider: Oneal Sanders MD  Dx:   Encounter Diagnosis     ICD-10-CM    1. Contusion of right hand, initial encounter  S60.221A                      Subjective: Pt reports her hand is still giving her pain, not sure if she can go back to full duty.       Objective: See treatment diary below      Assessment: Tolerated treatment well. Patient exhibited good technique with therapeutic exercises  Full passive flexion/extension  Able to move MCP joint into 15-20 degrees of hyperextension  Mild intrinsic tightness   II R/L 45/70 lbs  3pt pinch 10/14 lbs  Goals  STG (2-3 weeks)  1:: pt independent in HEP- met  2: Pt able to form 75% comp fist- met  3: Pt able to extend 50% more- met  4: Pt able to cylindrical and pincher grasp and hold objects without limitation- met    LTG (6-8 weeks)  1: Improve FOTO > 60/100- met  2: Pt able to form full comp flexion- met  3: Pt able to apply pressure to palm- met  4: Pt zina to perform all ADL's- met  5: Pt able to lift and carry items > 10 lbs unilaterally- not met  6:  strength at least 40 lbs- met  7: Pt able to complete job tasks without limitation- not met    Discussed with Pt her progress so far. Objectively she has met most of her therapy goals. Pt reports she still experiences pain and fatigue in her hand after activity.     Plan: Continue PT as physicians discretion     Precautions: DOI 24  HEP: STM, desensitization activities, digits PROM, (L2) putty roll/pinch, putty digit abd/add    Manuals  8        MH in extension 10 10 10 10 10        STM 5 5 5 5 5        PROM digits 5 5 5 5 5         20 20 20 20 20        Neuro Re-Ed                                       Ther Ex             Towel bunches 2.5#  2 min 2.5# 2 min 2.5# 2 min 2.5# 2 min 2.5# 2 min        Skinny pegs grasping 3 min  3 min 3 min 3 min  3 min         Wrist PRE's 3# 2x10 3# 2x10 3# 2x10 3# 2x10 3# 2x10        clothespins Red/green 4 min  Green/blue 4 min  Green/blue 4 min Green/blue 4 min  Green/blue  4min        fingerweb Green 30x  Green 30x Green 30x Green 30x Green 30x        digiweb Yellow 30x  Yellow 30x Yellow 30x  Yellow 30x  Yellow 30x        Digiball puppet hand Red 30x Red 30x  Red 30x Red 30x  Red 30x                      20 20 20 20 20        Ther Activity                                       Gait Training                                       Modalities

## 2024-08-02 ENCOUNTER — APPOINTMENT (OUTPATIENT)
Dept: RADIOLOGY | Age: 24
End: 2024-08-02
Payer: COMMERCIAL

## 2024-08-02 ENCOUNTER — OFFICE VISIT (OUTPATIENT)
Dept: URGENT CARE | Age: 24
End: 2024-08-02
Payer: COMMERCIAL

## 2024-08-02 VITALS
TEMPERATURE: 97.8 F | DIASTOLIC BLOOD PRESSURE: 80 MMHG | HEART RATE: 78 BPM | RESPIRATION RATE: 20 BRPM | SYSTOLIC BLOOD PRESSURE: 120 MMHG | OXYGEN SATURATION: 99 %

## 2024-08-02 DIAGNOSIS — M79.672 LEFT FOOT PAIN: ICD-10-CM

## 2024-08-02 DIAGNOSIS — S92.352A CLOSED DISPLACED FRACTURE OF FIFTH METATARSAL BONE OF LEFT FOOT, INITIAL ENCOUNTER: Primary | ICD-10-CM

## 2024-08-02 PROCEDURE — 73610 X-RAY EXAM OF ANKLE: CPT

## 2024-08-02 PROCEDURE — 99213 OFFICE O/P EST LOW 20 MIN: CPT | Performed by: FAMILY MEDICINE

## 2024-08-02 PROCEDURE — 73630 X-RAY EXAM OF FOOT: CPT

## 2024-08-02 NOTE — LETTER
August 2, 2024     Patient: Kori Davis   YOB: 2000   Date of Visit: 8/2/2024       To Whom It May Concern:    It is my medical opinion that Kori Davis should remain out of work until seen by orthopedics .    If you have any questions or concerns, please don't hesitate to call.         Sincerely,        Li Ahuja,     CC: No Recipients

## 2024-08-02 NOTE — PROGRESS NOTES
[facility]        NAME: Kori Davis is a 23 y.o. female  : 2000    MRN: [id number]  DATE: 2024  TIME: 4:36 PM    Assessment and Plan   Left foot pain [M79.672]  1. Left foot pain  XR foot 3+ vw left    XR ankle 3+ vw left            Patient Instructions       Follow up with PCP in 3-5 days.  Proceed to  ER if symptoms worsen.    If tests have been performed at [facility], our office will contact you with results if changes need to be made to the care plan discussed with you at the visit.  You can review your full results on [facility].    Chief Complaint     Chief Complaint   Patient presents with   • Ankle Pain   • Foot Pain     Patient reports was walking dog last night , dog pulled on the leash too hard patients left ankle twisted . Reports swelling and bruising on anterior of foot and ankle . Last dose of ibuprofen today 400mg            History of Present Illness       HPI    Review of Systems   Review of Systems      Current Medications       Current Outpatient Medications:   •  [employer] Purelax 17 GM/SCOOP powder, TAKE 17 G BY MOUTH 2 (TWO) TIMES A DAY. MIX 1 HEAPING TABLESPOON (17 G) WITH 8 OUNCES OF WATER. PREPARE AND DRINK SOLUTION ONCE DAILY., Disp: , Rfl:   •  [employer] Senna 8.6 MG tablet, TAKE 1 TABLET (8.6 MG TOTAL) BY MOUTH DAILY AS NEEDED FOR CONSTIPATION (IF NO BM FOR 1 DAY)., Disp: , Rfl:   •  fluticasone (FLONASE) 50 mcg/act nasal spray, spray 2 sprays into each nostril every day, Disp: , Rfl:   •  Lety FE 1.5/30 1.5-30 MG-MCG tablet, TAKE 1 TABLET BY MOUTH EVERY DAY FOR BIRTH CONTROL., Disp: , Rfl:   •  levothyroxine 50 mcg tablet, Take 50 mcg by mouth daily, Disp: , Rfl:   •  methocarbamol (ROBAXIN) 500 mg tablet, Take 1 tablet (500 mg total) by mouth 2 (two) times a day, Disp: 30 tablet, Rfl: 0  •  phentermine 37.5 MG capsule, Take 37.5 mg by mouth, Disp: , Rfl:   •  topiramate (TOPAMAX) 25 mg tablet, Take 25 mg by mouth 2 (two) times a day, Disp: , Rfl:   •   Dextromethorphan-guaiFENesin  MG/5ML LIQD, Take by mouth every 6 (six) hours (Patient not taking: Reported on 8/2/2024), Disp: , Rfl:   •  diclofenac (VOLTAREN) 75 mg EC tablet, Take 75 mg by mouth 2 (two) times a day with meals (Patient not taking: Reported on 8/2/2024), Disp: , Rfl:   •  ibuprofen (MOTRIN) 400 mg tablet, Take 1 tablet (400 mg total) by mouth every 6 (six) hours as needed for mild pain for up to 3 days, Disp: 12 tablet, Rfl: 0    Current Allergies     Allergies as of 08/02/2024 - Reviewed 06/28/2024   Allergen Reaction Noted   • Pollen extract Rash 09/01/2020            The following portions of the patient's history were reviewed and updated as appropriate: allergies, current medications, past family history, past medical history, past social history, past surgical history and problem list.     Past Medical History:   Diagnosis Date   • Disease of thyroid gland        No past surgical history on file.    No family history on file.      Medications have been verified.        Objective   /80   Pulse 78   Temp 97.8 °F (36.6 °C) (Temporal)   Resp 20   SpO2 99%   No LMP recorded.       Physical Exam     Physical Exam

## 2024-08-02 NOTE — ASSESSMENT & PLAN NOTE
Xray showed fracture of fifth metatarsal  CAM boot applied  Ortho referral placed and given to patient  Work note until seen by ortho

## 2024-08-02 NOTE — PROGRESS NOTES
[facility]        NAME: Kori Davis is a 23 y.o. female  : 2000    MRN: [id number]  DATE: 2024  TIME: 8:08 AM    Assessment and Plan   Closed displaced fracture of fifth metatarsal bone of left foot, initial encounter [S92.352A]  1. Closed displaced fracture of fifth metatarsal bone of left foot, initial encounter  Ambulatory Referral to Orthopedic Surgery      2. Left foot pain  XR foot 3+ vw left    XR ankle 3+ vw left            Patient Instructions       Follow up with PCP in 3-5 days.  Proceed to  ER if symptoms worsen.    If tests have been performed at [facility], our office will contact you with results if changes need to be made to the care plan discussed with you at the visit.  You can review your full results on [facility] MyChart.    Chief Complaint     Chief Complaint   Patient presents with   • Ankle Pain   • Foot Pain     Patient reports was walking dog last night , dog pulled on the leash too hard patients left ankle twisted . Reports swelling and bruising on anterior of foot and ankle . Last dose of ibuprofen today 400mg            History of Present Illness       Patient was walking her dog at night when she stepped off the steps into her home and her ankle twisted and she had pain in her foot  She had pain immediately but was able to walk in and go to sleep. In the morning she noted swelling and then had increased pain upon walking. She applied warm water which helped but it continued to hurt when she put pressure on it.  She called her work who told her to be seen.      Ankle Pain   The incident occurred 12 to 24 hours ago. The incident occurred at home. The injury mechanism was an inversion injury. The pain is present in the left foot. The quality of the pain is described as shooting. The pain is at a severity of 7/10. The pain is moderate. The pain has been Fluctuating since onset. Associated symptoms include a loss of motion. Pertinent negatives include no  inability to bear weight, loss of sensation, muscle weakness, numbness or tingling. She reports no foreign bodies present. The symptoms are aggravated by movement and weight bearing. She has tried nothing for the symptoms.       Review of Systems   Review of Systems   Musculoskeletal:  Positive for arthralgias, gait problem and joint swelling.   Neurological:  Negative for tingling and numbness.         Current Medications       Current Outpatient Medications:   •  [employer]S Purelax 17 GM/SCOOP powder, TAKE 17 G BY MOUTH 2 (TWO) TIMES A DAY. MIX 1 HEAPING TABLESPOON (17 G) WITH 8 OUNCES OF WATER. PREPARE AND DRINK SOLUTION ONCE DAILY., Disp: , Rfl:   •  [employer]S Senna 8.6 MG tablet, TAKE 1 TABLET (8.6 MG TOTAL) BY MOUTH DAILY AS NEEDED FOR CONSTIPATION (IF NO BM FOR 1 DAY)., Disp: , Rfl:   •  fluticasone (FLONASE) 50 mcg/act nasal spray, spray 2 sprays into each nostril every day, Disp: , Rfl:   •  Lety FE 1.5/30 1.5-30 MG-MCG tablet, TAKE 1 TABLET BY MOUTH EVERY DAY FOR BIRTH CONTROL., Disp: , Rfl:   •  levothyroxine 50 mcg tablet, Take 50 mcg by mouth daily, Disp: , Rfl:   •  methocarbamol (ROBAXIN) 500 mg tablet, Take 1 tablet (500 mg total) by mouth 2 (two) times a day, Disp: 30 tablet, Rfl: 0  •  phentermine 37.5 MG capsule, Take 37.5 mg by mouth, Disp: , Rfl:   •  topiramate (TOPAMAX) 25 mg tablet, Take 25 mg by mouth 2 (two) times a day, Disp: , Rfl:   •  Dextromethorphan-guaiFENesin  MG/5ML LIQD, Take by mouth every 6 (six) hours (Patient not taking: Reported on 8/2/2024), Disp: , Rfl:   •  diclofenac (VOLTAREN) 75 mg EC tablet, Take 75 mg by mouth 2 (two) times a day with meals (Patient not taking: Reported on 8/2/2024), Disp: , Rfl:   •  ibuprofen (MOTRIN) 400 mg tablet, Take 1 tablet (400 mg total) by mouth every 6 (six) hours as needed for mild pain for up to 3 days, Disp: 12 tablet, Rfl: 0    Current Allergies     Allergies as of 08/02/2024 - Reviewed 06/28/2024   Allergen Reaction Noted   • Pollen extract  Rash 09/01/2020            The following portions of the patient's history were reviewed and updated as appropriate: allergies, current medications, past family history, past medical history, past social history, past surgical history and problem list.     Past Medical History:   Diagnosis Date   • Disease of thyroid gland        No past surgical history on file.    No family history on file.      Medications have been verified.        Objective   /80   Pulse 78   Temp 97.8 °F (36.6 °C) (Temporal)   Resp 20   SpO2 99%   No LMP recorded.       Physical Exam     Physical Exam  Vitals reviewed.   Constitutional:       Appearance: Normal appearance. She is normal weight.   HENT:      Head: Normocephalic.   Pulmonary:      Effort: Pulmonary effort is normal.   Musculoskeletal:         General: Swelling present.      Comments: Swelling, ecchymosis and tenderness over the base of the 5th metatarsal.     Neurological:      Mental Status: She is alert.   Psychiatric:         Mood and Affect: Mood normal.         Behavior: Behavior normal.         Thought Content: Thought content normal.

## 2024-08-05 ENCOUNTER — TELEPHONE (OUTPATIENT)
Dept: OBGYN CLINIC | Facility: HOSPITAL | Age: 24
End: 2024-08-05

## 2024-08-05 NOTE — TELEPHONE ENCOUNTER
While working on pre-registration for tomorrow's appointments, I came across this patient's insurance. [employer], [employer] [facility] EPO is what the patient has and I confirmed this with her on the phone and also confirmed that this is the insurance she will be using tomorrow. Patient said yes.     I informed her that this insurance is only par with the [facility] side but it is out of network with the hospital side. This would include x-rays, MRI, CT etc, anything hospital related like surgery.   Patient already had xrays done in the hospital so she most likely wont need any tomorrow but I still called her to inform her of the out of network status.     Patient verbalized understanding and still wants to come in for her appointment.

## 2024-08-06 ENCOUNTER — OFFICE VISIT (OUTPATIENT)
Dept: OBGYN CLINIC | Facility: HOSPITAL | Age: 24
End: 2024-08-06
Payer: COMMERCIAL

## 2024-08-06 VITALS
DIASTOLIC BLOOD PRESSURE: 87 MMHG | HEART RATE: 89 BPM | SYSTOLIC BLOOD PRESSURE: 125 MMHG | HEIGHT: 63 IN | BODY MASS INDEX: 23.57 KG/M2 | WEIGHT: 133 LBS

## 2024-08-06 DIAGNOSIS — S92.352A CLOSED DISPLACED FRACTURE OF FIFTH METATARSAL BONE OF LEFT FOOT, INITIAL ENCOUNTER: Primary | ICD-10-CM

## 2024-08-06 PROCEDURE — 99203 OFFICE O/P NEW LOW 30 MIN: CPT | Performed by: ORTHOPAEDIC SURGERY

## 2024-08-06 RX ORDER — TOPIRAMATE 50 MG/1
50 TABLET, FILM COATED ORAL 2 TIMES DAILY
COMMUNITY
Start: 2024-07-16

## 2024-08-06 NOTE — PROGRESS NOTES
Assessment:   Diagnosis ICD-10-CM Associated Orders   1. Closed displaced fracture of fifth metatarsal bone of left foot, initial encounter  S92.352A Ambulatory Referral to Orthopedic Surgery          Plan:  A discussion was had with the patient that her fracture should heal well without surgery.  She may bear weight as tolerated on her left leg in the boot.  We discussed that her foot may be swollen for another 2-3 months.    She was provided with a work note today allowing her to return to work with restrictions, and stating that she should be allowed to remain out of work if the restrictions are unable to be accommodated for.    To do next visit:  Follow-up in 4 weeks for new X-rays and to assess pain and function.    The above stated was discussed in layman's terms and the patient expressed understanding.  All questions were answered to the patient's satisfaction.         Scribe Attestation      I,:  Mariela Chaudhry PA-C am acting as a scribe while in the presence of the attending physician.:       I,:  Cricket Charles MD personally performed the services described in this documentation    as scribed in my presence.:             Subjective:   Kori Davis is a 23 y.o. female who presents to the office today as a new patient for initial evaluation of a fifth metatarsal fracture of the left foot sustained 4 days ago on 8/2/24.  At the onset of the injury, the patient was walking when she rolled her ankle and fell.  She had immediate pain in her foot and presented to a [facility] where she was diagnosed with the fracture and placed into a boot.  She has been bearing weight on her left foot in the boot.  She takes Tylenol PRN pain.  She needs a work note today.      Review of systems negative unless otherwise specified in HPI    Past Medical History:   Diagnosis Date    Disease of thyroid gland        History reviewed. No pertinent surgical history.    History reviewed. No pertinent family history.    Social History      Occupational History    Not on file   Tobacco Use    Smoking status: Never    Smokeless tobacco: Never   Substance and Sexual Activity    Alcohol use: Never    Drug use: Never    Sexual activity: Not on file         Current Outpatient Medications:     [employer]S Purelax 17 GM/SCOOP powder, TAKE 17 G BY MOUTH 2 (TWO) TIMES A DAY. MIX 1 HEAPING TABLESPOON (17 G) WITH 8 OUNCES OF WATER. PREPARE AND DRINK SOLUTION ONCE DAILY., Disp: , Rfl:     CVS Senna 8.6 MG tablet, TAKE 1 TABLET (8.6 MG TOTAL) BY MOUTH DAILY AS NEEDED FOR CONSTIPATION (IF NO BM FOR 1 DAY)., Disp: , Rfl:     fluticasone (FLONASE) 50 mcg/act nasal spray, spray 2 sprays into each nostril every day, Disp: , Rfl:     Lety FE 1.5/30 1.5-30 MG-MCG tablet, TAKE 1 TABLET BY MOUTH EVERY DAY FOR BIRTH CONTROL., Disp: , Rfl:     levothyroxine 50 mcg tablet, Take 50 mcg by mouth daily, Disp: , Rfl:     phentermine 37.5 MG capsule, Take 37.5 mg by mouth, Disp: , Rfl:     topiramate (TOPAMAX) 50 MG tablet, Take 50 mg by mouth 2 (two) times a day, Disp: , Rfl:     Dextromethorphan-guaiFENesin  MG/5ML LIQD, Take by mouth every 6 (six) hours (Patient not taking: Reported on 8/2/2024), Disp: , Rfl:     diclofenac (VOLTAREN) 75 mg EC tablet, Take 75 mg by mouth 2 (two) times a day with meals (Patient not taking: Reported on 8/2/2024), Disp: , Rfl:     ibuprofen (MOTRIN) 400 mg tablet, Take 1 tablet (400 mg total) by mouth every 6 (six) hours as needed for mild pain for up to 3 days, Disp: 12 tablet, Rfl: 0    methocarbamol (ROBAXIN) 500 mg tablet, Take 1 tablet (500 mg total) by mouth 2 (two) times a day (Patient not taking: Reported on 8/6/2024), Disp: 30 tablet, Rfl: 0    topiramate (TOPAMAX) 25 mg tablet, Take 25 mg by mouth 2 (two) times a day (Patient not taking: Reported on 8/6/2024), Disp: , Rfl:     Allergies   Allergen Reactions    Pollen Extract Rash            Vitals:    08/06/24 0910   BP: 125/87   Pulse: 89       Objective:    General:  Patient is  "WDWN, alert and oriented, appears stated age, and is in no acute distress.    Musculoskeletal:    Left Foot:    Inspection:  Swelling and ecchymosis is present over the lateral aspect of the foot.    Range of Motion:  Motor intact FHL/EHL.    Palpation:  She is tender over the base of the fifth metatarsal.    Sensation:  SILT over the toes.    Other:  Toes WWP.        Diagnostics, reviewed and taken today if performed as documented:    The attending physician has personally reviewed the pertinent films in PACS and interpretation is as follows:  Left Foot X-rays 8/2/24:  Fracture is present at the base of the fifth metatarsal.      Procedures, if performed today:    None performed      Portions of the record may have been created with voice recognition software.  Occasional wrong word or \"sound a like\" substitutions may have occurred due to the inherent limitations of voice recognition software.  Read the chart carefully and recognize, using context, where substitutions have occurred.  "

## 2024-08-06 NOTE — LETTER
Patient: Kori Davis   YOB: 2000   Date of Visit: 2024         To Whom It May Concern:     Please let it be known that Kori Davis,  2000, is under our professional care.  She was seen in our office on 24.  At this time, the patient may return to work with the following restrictions:  She must wear her boot at all times.  She must be allowed to sit for the majority of the day.  If the accommodations are unable to be met, she should remain out of work until her next visit with us in 4 weeks.    If you have any questions or concerns, please don't hesitate to call.           Sincerely,           ALLEN Parks Dr., MD

## 2024-08-07 ENCOUNTER — TELEPHONE (OUTPATIENT)
Age: 24
End: 2024-08-07

## 2024-08-07 NOTE — TELEPHONE ENCOUNTER
Caller: Kori    Doctor: Melvin    Reason for call: patient wanted to know if the office received her short term disability  Paper work.  Please advise patient.  Thank you     Call back#: [phone]

## 2024-08-08 ENCOUNTER — TELEPHONE (OUTPATIENT)
Age: 24
End: 2024-08-08

## 2024-08-08 NOTE — TELEPHONE ENCOUNTER
Caller: [email or web address]     Doctor: Melvin    Reason for call: Checking for a work status    Call back#: [phone]

## 2024-08-08 NOTE — TELEPHONE ENCOUNTER
Caller: Patient    Doctor: Melvin     Reason for call: Calling to see if forms received, patient aware they where received by office and will take 10-14 days to complete. Just requesting a call back once they are finished     Call back#: [phone]

## 2024-08-09 ENCOUNTER — OFFICE VISIT (OUTPATIENT)
Dept: OBGYN CLINIC | Facility: CLINIC | Age: 24
End: 2024-08-09
Payer: OTHER MISCELLANEOUS

## 2024-08-09 VITALS — BODY MASS INDEX: 23.57 KG/M2 | HEIGHT: 63 IN | WEIGHT: 133 LBS

## 2024-08-09 DIAGNOSIS — S60.221A CONTUSION OF RIGHT HAND, INITIAL ENCOUNTER: Primary | ICD-10-CM

## 2024-08-09 PROCEDURE — 99214 OFFICE O/P EST MOD 30 MIN: CPT | Performed by: STUDENT IN AN ORGANIZED HEALTH CARE EDUCATION/TRAINING PROGRAM

## 2024-08-09 NOTE — PROGRESS NOTES
ORTHOPAEDIC HAND, WRIST, AND ELBOW OFFICE  VISIT      ASSESSMENT/PLAN:      Diagnoses and all orders for this visit:    Contusion of right hand, initial encounter  -     MRI hand right wo contrast; Future          23 y.o. female with right hand contusion sustained at work 5/6/2024   Since pt still having issues despite time and use of therapy, discussed getting further testing with MRI.  Will do MRI of the hand as pt really only complaining of pain in the palm.   Further recs based off MRI results.  Will have pt c/w no use of right hand until we get the MRI      Follow Up:  After testing       To Do Next Visit:  Re-evaluation of current issue        Oneal Sanders MD  Attending, Orthopaedic Surgery  Hand, Wrist, and Elbow Surgery  [facility]    ______________________________________________________________________________________________    CHIEF COMPLAINT:  Chief Complaint   Patient presents with   • Right Hand - Follow-up       SUBJECTIVE:  Patient is a 23 y.o. RHD female who presents today for follow up of right hand contusion sustained at work 5/6/2024. At her last appointment she had made some progress with therapy and was encouraged to c/w this. Pt states that with this hand everything is better other than the palm. She does have scar tissue here and they've continued to work on this, but she has continued pain and describes difficulty abducting her fingers.        Occupation: Makes housing/bolts    I have personally reviewed all the relevant PMH, PSH, SH, FH, Medications and allergies      PAST MEDICAL HISTORY:  Past Medical History:   Diagnosis Date   • Disease of thyroid gland        PAST SURGICAL HISTORY:  History reviewed. No pertinent surgical history.    FAMILY HISTORY:  History reviewed. No pertinent family history.    SOCIAL HISTORY:  Social History     Tobacco Use   • Smoking status: Never   • Smokeless tobacco: Never   Substance Use Topics   • Alcohol use: Never   • Drug  "use: Never       MEDICATIONS:    Current Outpatient Medications:   •  CVS Purelax 17 GM/SCOOP powder, TAKE 17 G BY MOUTH 2 (TWO) TIMES A DAY. MIX 1 HEAPING TABLESPOON (17 G) WITH 8 OUNCES OF WATER. PREPARE AND DRINK SOLUTION ONCE DAILY., Disp: , Rfl:   •  CVS Senna 8.6 MG tablet, TAKE 1 TABLET (8.6 MG TOTAL) BY MOUTH DAILY AS NEEDED FOR CONSTIPATION (IF NO BM FOR 1 DAY)., Disp: , Rfl:   •  fluticasone (FLONASE) 50 mcg/act nasal spray, spray 2 sprays into each nostril every day, Disp: , Rfl:   •  Lety FE 1.5/30 1.5-30 MG-MCG tablet, TAKE 1 TABLET BY MOUTH EVERY DAY FOR BIRTH CONTROL., Disp: , Rfl:   •  levothyroxine 50 mcg tablet, Take 50 mcg by mouth daily, Disp: , Rfl:   •  phentermine 37.5 MG capsule, Take 37.5 mg by mouth, Disp: , Rfl:   •  topiramate (TOPAMAX) 50 MG tablet, Take 50 mg by mouth 2 (two) times a day, Disp: , Rfl:   •  Dextromethorphan-guaiFENesin  MG/5ML LIQD, Take by mouth every 6 (six) hours (Patient not taking: Reported on 8/2/2024), Disp: , Rfl:   •  diclofenac (VOLTAREN) 75 mg EC tablet, Take 75 mg by mouth 2 (two) times a day with meals (Patient not taking: Reported on 8/2/2024), Disp: , Rfl:   •  ibuprofen (MOTRIN) 400 mg tablet, Take 1 tablet (400 mg total) by mouth every 6 (six) hours as needed for mild pain for up to 3 days, Disp: 12 tablet, Rfl: 0  •  methocarbamol (ROBAXIN) 500 mg tablet, Take 1 tablet (500 mg total) by mouth 2 (two) times a day (Patient not taking: Reported on 8/6/2024), Disp: 30 tablet, Rfl: 0  •  topiramate (TOPAMAX) 25 mg tablet, Take 25 mg by mouth 2 (two) times a day (Patient not taking: Reported on 8/6/2024), Disp: , Rfl:     ALLERGIES:  Allergies   Allergen Reactions   • Pollen Extract Rash           REVIEW OF SYSTEMS:  Musculoskeletal:        As noted in HPI.   All other systems reviewed and are negative.    VITALS:  There were no vitals filed for this visit.    LABS:  HgA1c: No results found for: \"HGBA1C\"  BMP:   Lab Results   Component Value Date    " CALCIUM 9.1 12/02/2022    K 3.8 12/02/2022    CO2 22 (L) 12/02/2022     12/02/2022    BUN 9 12/02/2022    CREATININE 0.81 12/02/2022       _____________________________________________________  PHYSICAL EXAMINATION:  General: Well developed and well nourished, alert & oriented x 3, appears comfortable  Psychiatric: Normal  HEENT: Normocephalic, Atraumatic Trachea Midline, No torticollis  Pulmonary: No audible wheezing or respiratory distress   Abdomen/GI: Non tender, non distended   Cardiovascular: No pitting edema, 2+ radial pulse   Skin: No Erythema, No Lacerations, No Fluctuation, No Ulcerations  Neurovascular: Sensation Intact to the Median, Ulnar, Radial Nerve, Motor Intact to the Median, Ulnar, Radial Nerve, and Pulses Intact  Musculoskeletal: Normal, except as noted in detailed exam and in HPI.      MUSCULOSKELETAL EXAMINATION:  Right Hand:  Pulp to palm 1.5 cm  Pt with scar tissue in palm in line with middle finger.   Ulnar deviation deformity of index finger.  Full digit extension.     ___________________________________________________  STUDIES REVIEWED:  Prior right hand x-rays reviewed  Therapy note from 8/1/24 reviewed      PROCEDURES PERFORMED:  Procedures  No Procedures performed today    _____________________________________________________      Scribe Attestation    I,:  Tania Garzon PA-C am acting as a scribe while in the presence of the attending physician.:       I,:  Oneal Sanders MD personally performed the services described in this documentation    as scribed in my presence.:

## 2024-08-09 NOTE — LETTER
August 9, 2024     Patient: Kori Davis  YOB: 2000  Date of Visit: 8/9/2024      To Whom it May Concern:    Kori Davis is under my professional care. Kori was seen in my office on 8/9/2024. Kori should continue with no use of the right hand until her MRI follow up appointment.     If you have any questions or concerns, please don't hesitate to call.         Sincerely,          Oneal Sanders MD        CC: No Recipients

## 2024-08-14 ENCOUNTER — TELEPHONE (OUTPATIENT)
Age: 24
End: 2024-08-14

## 2024-08-14 NOTE — TELEPHONE ENCOUNTER
Caller: Patient    Doctor: Melvin    Reason for call: Patient calling to check on status of paperwork.  Advised that we have received the paperwork and that it is in process, but can take up to 14 days.    Patient states she would appreciate a call back when the paperwork is complete, as her insurance has been frequently reaching out to her to check if it is completed.    Please let patient know when paperwork is complete.    Call back#: [phone]

## 2024-08-15 ENCOUNTER — OFFICE VISIT (OUTPATIENT)
Dept: PHYSICAL THERAPY | Facility: MEDICAL CENTER | Age: 24
End: 2024-08-15
Payer: OTHER MISCELLANEOUS

## 2024-08-15 DIAGNOSIS — S60.221A CONTUSION OF RIGHT HAND, INITIAL ENCOUNTER: Primary | ICD-10-CM

## 2024-08-15 PROCEDURE — 97140 MANUAL THERAPY 1/> REGIONS: CPT | Performed by: PHYSICAL THERAPIST

## 2024-08-15 PROCEDURE — 97110 THERAPEUTIC EXERCISES: CPT | Performed by: PHYSICAL THERAPIST

## 2024-08-15 NOTE — PROGRESS NOTES
Daily Note     Today's date: 8/15/2024  Patient name: Kori Davis  : 2000  MRN: [id number]  Referring provider: Oneal Sanders MD  Dx:   Encounter Diagnosis     ICD-10-CM    1. Contusion of right hand, initial encounter  S60.221A                      Subjective: Pt reports since she has been away she fell and broke her metatarsal bone in L foot. Going to get MRI due to her continue pain in palm.       Objective: See treatment diary below      Assessment: Tolerated treatment well. Patient exhibited good technique with therapeutic exercises and would benefit from continued PT  Initially, Pt had mild intrinsic tightness in her MF, able to resolve with manuals to full ROM in both hook fist and composite fist  Pt arrived 15 min late , abbreviated program  No complaints post session    Plan: Continue per plan of care.      Precautions: DOI 24  HEP: STM, desensitization activities, digits PROM, (L2) putty roll/pinch, putty digit abd/add    Manuals 7/18 7/23 7/25 7/30 8/1 8/15       MH in extension 10 10 10 10 10 10       STM 5 5 5 5 5 5       PROM digits 5 5 5 5 5 5        20 20 20 20 20 20       Neuro Re-Ed                                       Ther Ex             Towel bunches 2.5#  2 min 2.5# 2 min 2.5# 2 min 2.5# 2 min 2.5# 2 min        Skinny pegs grasping 3 min  3 min 3 min 3 min  3 min        Wrist PRE's 3# 2x10 3# 2x10 3# 2x10 3# 2x10 3# 2x10 3# 2x10       clothespins Red/green 4 min  Green/blue 4 min  Green/blue 4 min Green/blue 4 min  Green/blue  4min  Green/blue 4 min       fingerweb Green 30x  Green 30x Green 30x Green 30x Green 30x Blue 30x       digiweb Yellow 30x  Yellow 30x Yellow 30x  Yellow 30x  Yellow 30x  Yellow 30x       Digiball puppet hand Red 30x Red 30x  Red 30x Red 30x  Red 30x Red 30x                     20 20 20 20 20 15       Ther Activity                                       Gait Training                                       Modalities

## 2024-08-16 ENCOUNTER — HOSPITAL ENCOUNTER (OUTPATIENT)
Dept: MRI IMAGING | Facility: HOSPITAL | Age: 24
Discharge: HOME | End: 2024-08-16
Payer: OTHER MISCELLANEOUS

## 2024-08-16 DIAGNOSIS — S60.221A CONTUSION OF RIGHT HAND, INITIAL ENCOUNTER: ICD-10-CM

## 2024-08-16 PROCEDURE — 73218 MRI UPPER EXTREMITY W/O DYE: CPT

## 2024-08-20 ENCOUNTER — OFFICE VISIT (OUTPATIENT)
Dept: PHYSICAL THERAPY | Facility: MEDICAL CENTER | Age: 24
End: 2024-08-20
Payer: OTHER MISCELLANEOUS

## 2024-08-20 DIAGNOSIS — S60.221A CONTUSION OF RIGHT HAND, INITIAL ENCOUNTER: Primary | ICD-10-CM

## 2024-08-20 PROCEDURE — 97140 MANUAL THERAPY 1/> REGIONS: CPT | Performed by: PHYSICAL THERAPIST

## 2024-08-20 PROCEDURE — 97110 THERAPEUTIC EXERCISES: CPT | Performed by: PHYSICAL THERAPIST

## 2024-08-20 NOTE — PROGRESS NOTES
Daily Note     Today's date: 2024  Patient name: Kori Davis  : 2000  MRN: [id number]  Referring provider: Oneal Sanders MD  Dx:   Encounter Diagnosis     ICD-10-CM    1. Contusion of right hand, initial encounter  S60.221A                      Subjective: Pt reports she had pain after positioning during MRI on Friday. Has been under stress taking care of her mom who is currently in hospital.       Objective: See treatment diary below      Assessment: Tolerated treatment well. Patient exhibited good technique with therapeutic exercises and would benefit from continued PT  Very minimal intrinsic tightness  Decreased stiffness moving MCP into hyperextension  Full passive comp flexion  Pt noted she had mild soreness post session.   Discussed pt's progress with her, pt reports she feels she needs more therapy on her hand.       Plan: Continue per plan of care.      Precautions: DOI 24  HEP: STM, desensitization activities, digits PROM, (L2) putty roll/pinch, putty digit abd/add    Manuals 7/18 7/23 7/25 7/30 8/1 8/15 8/20      MH in extension 10 10 10 10 10 10 10      STM 5 5 5 5 5 5 5      PROM digits 5 5 5 5 5 5 5       20 20 20 20 20 20 20      Neuro Re-Ed                                       Ther Ex             Towel bunches 2.5#  2 min 2.5# 2 min 2.5# 2 min 2.5# 2 min 2.5# 2 min        Skinny pegs grasping 3 min  3 min 3 min 3 min  3 min        Wrist PRE's 3# 2x10 3# 2x10 3# 2x10 3# 2x10 3# 2x10 3# 2x10 3# 3x10      clothespins Red/green 4 min  Green/blue 4 min  Green/blue 4 min Green/blue 4 min  Green/blue  4min  Green/blue 4 min Green/blue 4 min      fingerweb Green 30x  Green 30x Green 30x Green 30x Green 30x Blue 30x Blue 30x      digiweb Yellow 30x  Yellow 30x Yellow 30x  Yellow 30x  Yellow 30x  Yellow 30x Yellow 30x      Digiball puppet hand Red 30x Red 30x  Red 30x Red 30x  Red 30x Red 30x Red 30x                    20 20 20 20 20 15 20      Ther Activity                                        Gait Training                                       Modalities

## 2024-08-22 ENCOUNTER — APPOINTMENT (OUTPATIENT)
Dept: PHYSICAL THERAPY | Facility: MEDICAL CENTER | Age: 24
End: 2024-08-22
Payer: OTHER MISCELLANEOUS

## 2024-08-22 ENCOUNTER — TELEPHONE (OUTPATIENT)
Dept: OBGYN CLINIC | Facility: MEDICAL CENTER | Age: 24
End: 2024-08-22

## 2024-08-22 NOTE — TELEPHONE ENCOUNTER
Caller: Kori     Doctor: Dr Sanders     Reason for call: The patient is calling to discuss her MRI done 8/16 on her right hand.  She states it was billed under insurance but it should be workers Comp.    Please call her regarding this, she is confused due to being told she needs another MRI.  Thank you     Call back#: [phone]

## 2024-08-22 NOTE — TELEPHONE ENCOUNTER
Pt returned call.    Warm transferred to [facility] Prior Authorizations to assist.

## 2024-08-22 NOTE — TELEPHONE ENCOUNTER
Lm for patient regarding authorization for MRI.( Call me back at [phone]) Procedure was authorized by ANJANA and info was in her record. Patient need to call billing department way her medical insurance was billed.  About order for another MRI, she need to contact ordering doctor.

## 2024-08-23 DIAGNOSIS — S92.352A CLOSED DISPLACED FRACTURE OF FIFTH METATARSAL BONE OF LEFT FOOT, INITIAL ENCOUNTER: Primary | ICD-10-CM

## 2024-08-26 ENCOUNTER — OFFICE VISIT (OUTPATIENT)
Dept: OBGYN CLINIC | Facility: CLINIC | Age: 24
End: 2024-08-26
Payer: OTHER MISCELLANEOUS

## 2024-08-26 VITALS — HEIGHT: 63 IN | WEIGHT: 133 LBS | BODY MASS INDEX: 23.57 KG/M2

## 2024-08-26 DIAGNOSIS — S60.221A CONTUSION OF RIGHT HAND, INITIAL ENCOUNTER: Primary | ICD-10-CM

## 2024-08-26 PROCEDURE — 99214 OFFICE O/P EST MOD 30 MIN: CPT | Performed by: STUDENT IN AN ORGANIZED HEALTH CARE EDUCATION/TRAINING PROGRAM

## 2024-08-26 NOTE — PROGRESS NOTES
ORTHOPAEDIC HAND, WRIST, AND ELBOW OFFICE  VISIT      ASSESSMENT/PLAN:      Diagnoses and all orders for this visit:    Contusion of right hand, initial encounter          23 y.o. female with right hand contusion sustained at work 5/6/2024.   MRI results reviewed with the patient.   Explained that with these results, she should continue to improve with time and use of therapy.  We will increase her work activity to a 2lb restriction with the right hand.       Follow Up:  6 weeks       To Do Next Visit:  Re-evaluation of current issue          Oneal Sanders MD  Attending, Orthopaedic Surgery  Hand, Wrist, and Elbow Surgery  [facility]    ______________________________________________________________________________________________    CHIEF COMPLAINT:  Chief Complaint   Patient presents with   • Right Hand - Follow-up       SUBJECTIVE:  Patient is a 23 y.o. RHD female who presents today for follow up of right hand contusion sustained at work 5/6/2024. Patient states she continues to have pain with weather changes. This is located around her index and middle fingers.      Occupation: Makes housing/bolts     I have personally reviewed all the relevant PMH, PSH, SH, FH, Medications and allergies      PAST MEDICAL HISTORY:  Past Medical History:   Diagnosis Date   • Disease of thyroid gland        PAST SURGICAL HISTORY:  History reviewed. No pertinent surgical history.    FAMILY HISTORY:  History reviewed. No pertinent family history.    SOCIAL HISTORY:  Social History     Tobacco Use   • Smoking status: Never   • Smokeless tobacco: Never   Substance Use Topics   • Alcohol use: Never   • Drug use: Never       MEDICATIONS:    Current Outpatient Medications:   •  CVS Purelax 17 GM/SCOOP powder, TAKE 17 G BY MOUTH 2 (TWO) TIMES A DAY. MIX 1 HEAPING TABLESPOON (17 G) WITH 8 OUNCES OF WATER. PREPARE AND DRINK SOLUTION ONCE DAILY., Disp: , Rfl:   •  CVS Senna 8.6 MG tablet, TAKE 1 TABLET (8.6 MG  "TOTAL) BY MOUTH DAILY AS NEEDED FOR CONSTIPATION (IF NO BM FOR 1 DAY)., Disp: , Rfl:   •  fluticasone (FLONASE) 50 mcg/act nasal spray, spray 2 sprays into each nostril every day, Disp: , Rfl:   •  Lety FE 1.5/30 1.5-30 MG-MCG tablet, TAKE 1 TABLET BY MOUTH EVERY DAY FOR BIRTH CONTROL., Disp: , Rfl:   •  levothyroxine 50 mcg tablet, Take 50 mcg by mouth daily, Disp: , Rfl:   •  phentermine 37.5 MG capsule, Take 37.5 mg by mouth, Disp: , Rfl:   •  topiramate (TOPAMAX) 50 MG tablet, Take 50 mg by mouth 2 (two) times a day, Disp: , Rfl:   •  Dextromethorphan-guaiFENesin  MG/5ML LIQD, Take by mouth every 6 (six) hours (Patient not taking: Reported on 8/2/2024), Disp: , Rfl:   •  diclofenac (VOLTAREN) 75 mg EC tablet, Take 75 mg by mouth 2 (two) times a day with meals (Patient not taking: Reported on 8/2/2024), Disp: , Rfl:   •  ibuprofen (MOTRIN) 400 mg tablet, Take 1 tablet (400 mg total) by mouth every 6 (six) hours as needed for mild pain for up to 3 days, Disp: 12 tablet, Rfl: 0  •  methocarbamol (ROBAXIN) 500 mg tablet, Take 1 tablet (500 mg total) by mouth 2 (two) times a day (Patient not taking: Reported on 8/6/2024), Disp: 30 tablet, Rfl: 0  •  topiramate (TOPAMAX) 25 mg tablet, Take 25 mg by mouth 2 (two) times a day (Patient not taking: Reported on 8/6/2024), Disp: , Rfl:     ALLERGIES:  Allergies   Allergen Reactions   • Pollen Extract Rash           REVIEW OF SYSTEMS:  Musculoskeletal:        As noted in HPI.   All other systems reviewed and are negative.    VITALS:  There were no vitals filed for this visit.    LABS:  HgA1c: No results found for: \"HGBA1C\"  BMP:   Lab Results   Component Value Date    CALCIUM 9.5 08/16/2024    K 4.4 08/16/2024    CO2 24 08/16/2024     08/16/2024    BUN 10 08/16/2024    CREATININE 0.89 08/16/2024       _____________________________________________________  PHYSICAL EXAMINATION:  General: Well developed and well nourished, alert & oriented x 3, appears " comfortable  Psychiatric: Normal  HEENT: Normocephalic, Atraumatic Trachea Midline, No torticollis  Pulmonary: No audible wheezing or respiratory distress   Abdomen/GI: Non tender, non distended   Cardiovascular: No pitting edema, 2+ radial pulse   Skin: No masses, erythema, lacerations, fluctation, ulcerations  Neurovascular: Sensation Intact to the Median, Ulnar, Radial Nerve, Motor Intact to the Median, Ulnar, Radial Nerve, and Pulses Intact  Musculoskeletal: Normal, except as noted in detailed exam and in HPI.      MUSCULOSKELETAL EXAMINATION:  Right Hand:  Able to get a fist with middle and small finger.   Scar tissue present in palm in line with middle finger.   Full digit extension.  Pt currently in serial casting for index finger.     ___________________________________________________  STUDIES REVIEWED:  MRI of the right hand was reviewed and independently interpreted in PACS by Dr. Sanders and demonstrates bone contusion of 2nd and 3rd metacarpals from diaphysis to metacarpal heads. No fracture.    Therapy note 8/20/24 reviewed      PROCEDURES PERFORMED:  Procedures  No Procedures performed today    _____________________________________________________      Scribe Attestation    I,:  Tania Garzon PA-C am acting as a scribe while in the presence of the attending physician.:       I,:  Oneal Sanders MD personally performed the services described in this documentation    as scribed in my presence.:

## 2024-08-26 NOTE — LETTER
August 26, 2024     Patient: Kori Davis  YOB: 2000  Date of Visit: 8/26/2024      To Whom it May Concern:    Kori Davis is under my professional care. Kori was seen in my office on 8/26/2024. Kori can return to work with a 2lb restriction for her right hand.    If you have any questions or concerns, please don't hesitate to call.         Sincerely,          Oneal Sanders MD        CC: No Recipients

## 2024-08-29 ENCOUNTER — APPOINTMENT (OUTPATIENT)
Dept: PHYSICAL THERAPY | Facility: MEDICAL CENTER | Age: 24
End: 2024-08-29
Payer: OTHER MISCELLANEOUS

## 2024-09-03 ENCOUNTER — HOSPITAL ENCOUNTER (OUTPATIENT)
Dept: RADIOLOGY | Facility: HOSPITAL | Age: 24
Discharge: HOME | End: 2024-09-03
Attending: ORTHOPAEDIC SURGERY
Payer: COMMERCIAL

## 2024-09-03 ENCOUNTER — OFFICE VISIT (OUTPATIENT)
Dept: OBGYN CLINIC | Facility: HOSPITAL | Age: 24
End: 2024-09-03
Payer: COMMERCIAL

## 2024-09-03 VITALS
HEIGHT: 63 IN | HEART RATE: 84 BPM | SYSTOLIC BLOOD PRESSURE: 137 MMHG | BODY MASS INDEX: 23.57 KG/M2 | WEIGHT: 133 LBS | DIASTOLIC BLOOD PRESSURE: 88 MMHG

## 2024-09-03 DIAGNOSIS — S92.352A CLOSED DISPLACED FRACTURE OF FIFTH METATARSAL BONE OF LEFT FOOT, INITIAL ENCOUNTER: ICD-10-CM

## 2024-09-03 DIAGNOSIS — S92.352D CLOSED DISPLACED FRACTURE OF FIFTH METATARSAL BONE OF LEFT FOOT WITH ROUTINE HEALING, SUBSEQUENT ENCOUNTER: Primary | ICD-10-CM

## 2024-09-03 PROCEDURE — 99213 OFFICE O/P EST LOW 20 MIN: CPT | Performed by: ORTHOPAEDIC SURGERY

## 2024-09-03 PROCEDURE — 73630 X-RAY EXAM OF FOOT: CPT

## 2024-09-03 NOTE — LETTER
September 3, 2024     Patient: Kori Davis  YOB: 2000  Date of Visit: 9/3/2024      To Whom it May Concern:    Kori Davis is under my professional care. Kori was seen in my office on 9/3/2024. At this time, the patient may return to work with the following restrictions:  She must wear her boot at all times.  She must be allowed to sit for the majority of the day.  If the accommodations are unable to be met, she should remain out of work until her next visit with us in 4 weeks.     If you have any questions or concerns, please don't hesitate to call.         Sincerely,          Cricket Charles MD        CC: No Recipients

## 2024-09-03 NOTE — PROGRESS NOTES
Assessment:  No diagnosis found.      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  All questions were answered to the patient's satisfaction.         Scribe Attestation      I,:  Oly Parekh am acting as a scribe while in the presence of the attending physician.:       I,:  Cricket Charles MD personally performed the services described in this documentation    as scribed in my presence.:             Subjective:   Kori Davis is a 23 y.o. female who presents to the office today for follow up evaluation of a fifth metatarsal fracture of the left foot sustained 8/2/24.  Since her last visit, she has been weight bearing in CAM boot at all times except for hygiene. She notes continued intermittent pain in the area of her fracture that is well controlled with Tylenol, ice. 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       Review of systems negative unless otherwise specified in HPI    Past Medical History:   Diagnosis Date    Disease of thyroid gland        History reviewed. No pertinent surgical history.    History reviewed. No pertinent family history.    Social History     Occupational History    Not on file   Tobacco Use    Smoking status: Never    Smokeless tobacco: Never   Substance and Sexual Activity    Alcohol use: Never    Drug use: Never    Sexual activity: Not on file         Current Outpatient Medications:     [facility] Purelax 17 GM/SCOOP powder, TAKE 17 G BY MOUTH 2 (TWO) TIMES A DAY. MIX 1 HEAPING TABLESPOON (17 G) WITH 8 OUNCES OF WATER. PREPARE AND DRINK  SOLUTION ONCE DAILY., Disp: , Rfl:     [facility] Senna 8.6 MG tablet, TAKE 1 TABLET (8.6 MG TOTAL) BY MOUTH DAILY AS NEEDED FOR CONSTIPATION (IF NO BM FOR 1 DAY)., Disp: , Rfl:     fluticasone (FLONASE) 50 mcg/act nasal spray, spray 2 sprays into each nostril every day, Disp: , Rfl:     Lety FE 1.5/30 1.5-30 MG-MCG tablet, TAKE 1 TABLET BY MOUTH EVERY DAY FOR BIRTH CONTROL., Disp: , Rfl:     levothyroxine 50 mcg tablet, Take 50 mcg by mouth daily, Disp: , Rfl:     phentermine 37.5 MG capsule, Take 37.5 mg by mouth, Disp: , Rfl:     topiramate (TOPAMAX) 50 MG tablet, Take 50 mg by mouth 2 (two) times a day, Disp: , Rfl:     Dextromethorphan-guaiFENesin  MG/5ML LIQD, Take by mouth every 6 (six) hours (Patient not taking: Reported on 8/2/2024), Disp: , Rfl:     diclofenac (VOLTAREN) 75 mg EC tablet, Take 75 mg by mouth 2 (two) times a day with meals (Patient not taking: Reported on 8/2/2024), Disp: , Rfl:     ibuprofen (MOTRIN) 400 mg tablet, Take 1 tablet (400 mg total) by mouth every 6 (six) hours as needed for mild pain for up to 3 days, Disp: 12 tablet, Rfl: 0    methocarbamol (ROBAXIN) 500 mg tablet, Take 1 tablet (500 mg total) by mouth 2 (two) times a day (Patient not taking: Reported on 8/6/2024), Disp: 30 tablet, Rfl: 0    topiramate (TOPAMAX) 25 mg tablet, Take 25 mg by mouth 2 (two) times a day (Patient not taking: Reported on 8/6/2024), Disp: , Rfl:     Allergies   Allergen Reactions    Pollen Extract Rash            Vitals:    09/03/24 1053   BP: 137/88   Pulse: 84       Objective:    General:  Patient is WDWN, alert and oriented, appears stated age, and is in no acute distress.    Musculoskeletal:    Left Foot:    Inspection:  Swelling and ecchymosis is present over the lateral aspect of the foot.    Range of Motion:  Motor intact FHL/EHL.    Palpation:  She is tender over the base of the fifth metatarsal.    Sensation:  SILT over the toes.    Other:  Toes WWP.        Diagnostics, reviewed and taken  "today if performed as documented:    The attending physician has personally reviewed the pertinent films in PACS and interpretation is as follows:  Left Foot X-rays 9/3/24:  Interval healing of fracture at base of the fifth metatarsal in maintained alignment      Procedures, if performed today:    None performed      Portions of the record may have been created with voice recognition software.  Occasional wrong word or \"sound a like\" substitutions may have occurred due to the inherent limitations of voice recognition software.  Read the chart carefully and recognize, using context, where substitutions have occurred.  "

## 2024-09-12 ENCOUNTER — TELEPHONE (OUTPATIENT)
Age: 24
End: 2024-09-12

## 2024-09-12 ENCOUNTER — TELEPHONE (OUTPATIENT)
Dept: OBGYN CLINIC | Facility: HOSPITAL | Age: 24
End: 2024-09-12

## 2024-09-12 NOTE — TELEPHONE ENCOUNTER
Caller: Patient     Doctor: Melvin     Reason for call: Patient called to confirmed we received her paperwork for [employer], I did tell her the turn around policy

## 2024-09-12 NOTE — TELEPHONE ENCOUNTER
Caller: Patient    Doctor: Melvin    Reason for call: Patient needed her work note faxed to  at [phone]    Call back#: n/a

## 2025-04-20 DIAGNOSIS — S60.221A CONTUSION OF RIGHT HAND, INITIAL ENCOUNTER: ICD-10-CM

## 2025-04-23 RX ORDER — METHOCARBAMOL 500 MG/1
500 TABLET, FILM COATED ORAL 2 TIMES DAILY
Qty: 30 TABLET | Refills: 0 | Status: SHIPPED | OUTPATIENT
Start: 2025-04-23

## 2025-05-17 DIAGNOSIS — S60.221A CONTUSION OF RIGHT HAND, INITIAL ENCOUNTER: ICD-10-CM

## 2025-05-19 RX ORDER — METHOCARBAMOL 500 MG/1
500 TABLET, FILM COATED ORAL 2 TIMES DAILY
Qty: 30 TABLET | Refills: 0 | OUTPATIENT
Start: 2025-05-19

## 2025-06-09 DIAGNOSIS — S60.221A CONTUSION OF RIGHT HAND, INITIAL ENCOUNTER: ICD-10-CM

## 2025-06-09 RX ORDER — METHOCARBAMOL 500 MG/1
500 TABLET, FILM COATED ORAL 2 TIMES DAILY
Qty: 30 TABLET | Refills: 0 | OUTPATIENT
Start: 2025-06-09